# Patient Record
Sex: FEMALE | Race: WHITE | NOT HISPANIC OR LATINO | Employment: UNEMPLOYED | URBAN - METROPOLITAN AREA
[De-identification: names, ages, dates, MRNs, and addresses within clinical notes are randomized per-mention and may not be internally consistent; named-entity substitution may affect disease eponyms.]

---

## 2021-03-15 ENCOUNTER — OFFICE VISIT (OUTPATIENT)
Dept: CARDIOLOGY | Facility: CLINIC | Age: 31
End: 2021-03-15

## 2021-03-15 VITALS
HEART RATE: 62 BPM | SYSTOLIC BLOOD PRESSURE: 114 MMHG | HEIGHT: 71 IN | DIASTOLIC BLOOD PRESSURE: 74 MMHG | WEIGHT: 180 LBS | BODY MASS INDEX: 25.2 KG/M2

## 2021-03-15 DIAGNOSIS — G90.9 AUTONOMIC DYSFUNCTION: Primary | ICD-10-CM

## 2021-03-15 PROCEDURE — 99204 OFFICE O/P NEW MOD 45 MIN: CPT | Performed by: INTERNAL MEDICINE

## 2021-03-15 RX ORDER — DROSPIRENONE AND ETHINYL ESTRADIOL 0.03MG-3MG
1 KIT ORAL DAILY
COMMUNITY
Start: 2021-03-06 | End: 2022-04-14 | Stop reason: SDDI

## 2021-06-02 ENCOUNTER — OFFICE VISIT (OUTPATIENT)
Dept: PODIATRY | Facility: CLINIC | Age: 31
End: 2021-06-02

## 2021-06-02 VITALS
SYSTOLIC BLOOD PRESSURE: 111 MMHG | WEIGHT: 180 LBS | HEIGHT: 71 IN | HEART RATE: 62 BPM | DIASTOLIC BLOOD PRESSURE: 71 MMHG | BODY MASS INDEX: 25.2 KG/M2

## 2021-06-02 DIAGNOSIS — R23.4 FISSURE IN SKIN OF FOOT: Primary | ICD-10-CM

## 2021-06-02 DIAGNOSIS — L85.3 XEROSIS OF SKIN: ICD-10-CM

## 2021-06-02 PROCEDURE — 99203 OFFICE O/P NEW LOW 30 MIN: CPT | Performed by: PODIATRIST

## 2021-06-02 RX ORDER — ESCITALOPRAM OXALATE 20 MG/1
20 TABLET ORAL DAILY
COMMUNITY
Start: 2021-04-02 | End: 2022-04-14 | Stop reason: SDDI

## 2021-06-02 NOTE — PROGRESS NOTES
2021  Foot and Ankle Surgery - New Patient   Provider: Dr. Lul Dunham DPM  Location: [facility]    Subjective:  Deepa Garcia is a 31 y.o. female.     Chief Complaint   Patient presents with   • Left Foot - Pain   • Right Foot - Pain       HPI: Patient is a 31-year-old female that presents with dry cracked skin intermittently to both feet.  Recently she has been having issues involving her left foot with intermittent fissuring.  She has not had any nnamdi open wounds or signs of infection.  She does notice discomfort intermittently with increased activity.  She has not seen a podiatrist in the past.  She has tried over-the-counter medications without any long-term success.  She denies any other issues with her feet.    Allergies   Allergen Reactions   • Metoprolol Rash       Past Medical History:   Diagnosis Date   • Arrhythmia     SVT   • Hypothyroidism        Past Surgical History:   Procedure Laterality Date   • ANKLE SURGERY     • CARDIAC ASSIST DEVICE INSERTION      Loop recorder   •  SECTION     • CHOLECYSTECTOMY     • FERTILITY SURGERY      IVF   • TONSILLECTOMY     • TUBAL ABDOMINAL LIGATION         Family History   Problem Relation Age of Onset   • Hypertension Mother    • Pulmonary embolism Father        Social History     Socioeconomic History   • Marital status:      Spouse name: Not on file   • Number of children: Not on file   • Years of education: Not on file   • Highest education level: Not on file   Tobacco Use   • Smoking status: Never Smoker   • Smokeless tobacco: Never Used   Vaping Use   • Vaping Use: Never used   Substance and Sexual Activity   • Alcohol use: Not Currently   • Drug use: Defer   • Sexual activity: Defer        Current Outpatient Medications on File Prior to Visit   Medication Sig Dispense Refill   • drospirenone-ethinyl estradiol (DYLON,OCELLA) 3-0.03 MG per tablet Take 1 tablet by mouth Daily.     • escitalopram (LEXAPRO) 20 MG tablet Take  "20 mg by mouth Daily.     • levothyroxine (SYNTHROID, LEVOTHROID) 75 MCG tablet Take 75 mcg by mouth Daily.       No current facility-administered medications on file prior to visit.       Review of Systems:  General: Denies fever, chills, fatigue, and weakness.  Eyes: Denies vision loss, blurry vision, and excessive redness.  ENT: Denies hearing issues and difficulty swallowing.  Cardiovascular: Denies palpitations, chest pain, or syncopal episodes.  Respiratory: Denies shortness of breath, wheezing, and coughing.  GI: Denies abdominal pain, nausea, and vomiting.   : Denies frequency, hematuria, and urgency.  Musculoskeletal: Denies muscle cramps, joint pains, and stiffness.  Derm: + Dry cracked skin to her feet  Neuro: Denies headaches, numbness, loss of coordination, and tremors.  Psych: Denies anxiety and depression.  Endocrine: Denies temperature intolerance and changes in appetite.  Heme: Denies bleeding disorders or abnormal bruising.     Objective   /71   Pulse 62   Ht 180.3 cm (71\")   Wt 81.6 kg (180 lb)   BMI 25.10 kg/m²     Foot/Ankle Exam:       General:   Appearance: appears stated age and healthy    Orientation: AAOx3    Affect: appropriate    Gait: unimpaired      VASCULAR      Right Foot Vascularity   Normal vascular exam    Dorsalis pedis:  2+  Posterior tibial:  2+  Skin Temperature: warm    Edema Grading:  None  CFT:  < 3 seconds  Pedal Hair Growth:  Present  Varicosities: none       Left Foot Vascularity   Normal vascular exam    Dorsalis pedis:  2+  Posterior tibial:  2+  Skin Temperature: warm    Edema Grading:  None  CFT:  < 3 seconds  Pedal Hair Growth:  Present  Varicosities: none        NEUROLOGIC     Right Foot Neurologic   Light touch sensation:  Normal  Hot/Cold sensation: normal    Achilles reflex:  2+     Left Foot Neurologic   Light touch sensation:  Normal  Hot/cold sensation: normal    Achilles reflex:  2+     MUSCULOSKELETAL      Right Foot Musculoskeletal   Ecchymosis: "  None  Tenderness: none       Left Foot Musculoskeletal   Ecchymosis:  None  Tenderness: none       MUSCLE STRENGTH     Right Foot Muscle Strength   Normal strength    Foot dorsiflexion:  5  Foot plantar flexion:  5  Foot inversion:  5  Foot eversion:  5     Left Foot Muscle Strength   Normal strength    Foot dorsiflexion:  5  Foot plantar flexion:  5  Foot inversion:  5  Foot eversion:  5     DERMATOLOGIC     Right Foot Dermatologic   Skin: dry skin       Left Foot Dermatologic   Skin: dry skin and fissure    Skin: no left foot cellulitis and no left foot ulcer       TESTS     Right Foot Tests   Anterior drawer: negative    Varus tilt: negative       Left Foot Tests   Anterior drawer: negative    Varus tilt: negative        Right Foot Additional Comments No gross deformity or instability to the feet.  Comfort with palpation.      Left Foot Additional Comments: Minimal fissuring noted to various areas of the left great toe.  Heels appear appropriate.      Assessment/Plan   Diagnoses and all orders for this visit:    1. Fissure in skin of foot (Primary)    2. Xerosis of skin      Patient presents with long duration of intermittent dry cracked skin involving the plantar aspect of both feet.  Recently she has been having issues with her left great toe.  She has no open wounds or pain today.  She has tried over-the-counter remedies without any significant improvement.  I did discuss the diagnosis and treatment options.  I have recommended that we proceed with urea 40% topical.  I have prescribed a medication and discussed proper use.  We also reviewed the importance of routine pedicures which may help these issues.  I have asked that she call with any additional issues or concerns.  She has opted to see me on an as-needed basis at this time.    No orders of the defined types were placed in this encounter.       Note is dictated utilizing voice recognition software. Unfortunately this leads to occasional typographical  errors. I apologize in advance if the situation occurs. If questions occur please do not hesitate to call our office.

## 2022-03-02 ENCOUNTER — TELEPHONE (OUTPATIENT)
Dept: CARDIOLOGY | Facility: CLINIC | Age: 32
End: 2022-03-02

## 2022-03-02 NOTE — TELEPHONE ENCOUNTER
3/2/22-Dr Garcia  Pt: Deepa Garcia    Pt. Would like for you to tell Dr Garcia thank you for helping her. He referred her to [facility] and they are helping her. She just wanted to let him know how much she appreciated him.

## 2022-04-14 ENCOUNTER — HOSPITAL ENCOUNTER (OUTPATIENT)
Dept: INFUSION THERAPY | Facility: HOSPITAL | Age: 32
Setting detail: INFUSION SERIES
Discharge: HOME | End: 2022-04-14

## 2022-04-14 VITALS
SYSTOLIC BLOOD PRESSURE: 108 MMHG | HEART RATE: 61 BPM | RESPIRATION RATE: 17 BRPM | OXYGEN SATURATION: 99 % | DIASTOLIC BLOOD PRESSURE: 72 MMHG

## 2022-04-14 DIAGNOSIS — E86.0 DEHYDRATION: Primary | ICD-10-CM

## 2022-04-14 DIAGNOSIS — A18.01 POTT'S DISEASE: ICD-10-CM

## 2022-04-14 PROCEDURE — 96365 THER/PROPH/DIAG IV INF INIT: CPT

## 2022-04-14 PROCEDURE — 96366 THER/PROPH/DIAG IV INF ADDON: CPT

## 2022-04-14 PROCEDURE — 36415 COLL VENOUS BLD VENIPUNCTURE: CPT

## 2022-04-14 PROCEDURE — 96360 HYDRATION IV INFUSION INIT: CPT

## 2022-04-14 RX ORDER — MIDODRINE HYDROCHLORIDE 2.5 MG/1
5 TABLET ORAL 3 TIMES DAILY
COMMUNITY
Start: 2022-03-23 | End: 2023-04-01

## 2022-04-14 RX ORDER — PROPRANOLOL HYDROCHLORIDE 10 MG/1
20 TABLET ORAL 3 TIMES DAILY
COMMUNITY
Start: 2022-03-01

## 2022-04-14 RX ORDER — FLUDROCORTISONE ACETATE 0.1 MG/1
0.05 TABLET ORAL DAILY
COMMUNITY
Start: 2022-04-07 | End: 2023-01-21

## 2022-04-14 RX ORDER — SODIUM CHLORIDE 1000 MG
TABLET, SOLUBLE MISCELLANEOUS
COMMUNITY
Start: 2022-03-10 | End: 2023-01-21

## 2022-04-14 RX ORDER — GUANFACINE 1 MG/1
2.5 TABLET, EXTENDED RELEASE ORAL DAILY
COMMUNITY
Start: 2022-03-01

## 2022-04-14 RX ORDER — LEVOTHYROXINE SODIUM 0.07 MG/1
1 TABLET ORAL DAILY
COMMUNITY

## 2022-04-14 RX ADMIN — SODIUM CHLORIDE, POTASSIUM CHLORIDE, SODIUM LACTATE AND CALCIUM CHLORIDE 1000 ML: 600; 310; 30; 20 INJECTION, SOLUTION INTRAVENOUS at 11:32

## 2022-04-18 ENCOUNTER — HOSPITAL ENCOUNTER (OUTPATIENT)
Dept: INFUSION THERAPY | Facility: HOSPITAL | Age: 32
Setting detail: INFUSION SERIES
Discharge: HOME | End: 2022-04-18

## 2022-04-18 VITALS
DIASTOLIC BLOOD PRESSURE: 72 MMHG | SYSTOLIC BLOOD PRESSURE: 106 MMHG | HEART RATE: 64 BPM | RESPIRATION RATE: 16 BRPM | OXYGEN SATURATION: 100 %

## 2022-04-18 DIAGNOSIS — A18.01 POTT'S DISEASE: ICD-10-CM

## 2022-04-18 DIAGNOSIS — E86.0 DEHYDRATION: Primary | ICD-10-CM

## 2022-04-18 PROCEDURE — 96360 HYDRATION IV INFUSION INIT: CPT

## 2022-04-18 RX ADMIN — SODIUM CHLORIDE, POTASSIUM CHLORIDE, SODIUM LACTATE AND CALCIUM CHLORIDE 1000 ML: 600; 310; 30; 20 INJECTION, SOLUTION INTRAVENOUS at 11:45

## 2022-04-20 ENCOUNTER — HOSPITAL ENCOUNTER (OUTPATIENT)
Dept: INFUSION THERAPY | Facility: HOSPITAL | Age: 32
Setting detail: INFUSION SERIES
Discharge: HOME | End: 2022-04-20

## 2022-04-20 VITALS
HEART RATE: 75 BPM | OXYGEN SATURATION: 100 % | DIASTOLIC BLOOD PRESSURE: 77 MMHG | SYSTOLIC BLOOD PRESSURE: 118 MMHG | RESPIRATION RATE: 16 BRPM

## 2022-04-20 DIAGNOSIS — A18.01 POTT'S DISEASE: ICD-10-CM

## 2022-04-20 DIAGNOSIS — A18.01 POTT'S DISEASE: Primary | ICD-10-CM

## 2022-04-20 DIAGNOSIS — E86.0 DEHYDRATION: Primary | ICD-10-CM

## 2022-04-20 PROCEDURE — 96365 THER/PROPH/DIAG IV INF INIT: CPT

## 2022-04-20 PROCEDURE — 96360 HYDRATION IV INFUSION INIT: CPT

## 2022-04-20 RX ADMIN — SODIUM CHLORIDE, POTASSIUM CHLORIDE, SODIUM LACTATE AND CALCIUM CHLORIDE 1000 ML: 600; 310; 30; 20 INJECTION, SOLUTION INTRAVENOUS at 11:33

## 2022-04-22 ENCOUNTER — HOSPITAL ENCOUNTER (OUTPATIENT)
Dept: INFUSION THERAPY | Facility: HOSPITAL | Age: 32
Setting detail: INFUSION SERIES
Discharge: HOME | End: 2022-04-22

## 2022-04-22 VITALS
SYSTOLIC BLOOD PRESSURE: 116 MMHG | DIASTOLIC BLOOD PRESSURE: 75 MMHG | RESPIRATION RATE: 17 BRPM | HEART RATE: 72 BPM | OXYGEN SATURATION: 100 %

## 2022-04-22 DIAGNOSIS — A18.01 POTT'S DISEASE: ICD-10-CM

## 2022-04-22 DIAGNOSIS — E86.0 DEHYDRATION: Primary | ICD-10-CM

## 2022-04-22 LAB
ALBUMIN SERPL-MCNC: 4.2 G/DL (ref 3.5–5.2)
ALBUMIN/GLOB SERPL: 1.7 G/DL
ALP SERPL-CCNC: 49 U/L (ref 39–117)
ALT SERPL W P-5'-P-CCNC: 12 U/L (ref 1–33)
ANION GAP SERPL CALCULATED.3IONS-SCNC: 10 MMOL/L (ref 5–15)
AST SERPL-CCNC: 17 U/L (ref 1–32)
BILIRUB SERPL-MCNC: 0.7 MG/DL (ref 0–1.2)
BUN SERPL-MCNC: 10 MG/DL (ref 6–20)
BUN/CREAT SERPL: 11.6 (ref 7–25)
CALCIUM SPEC-SCNC: 8.9 MG/DL (ref 8.6–10.5)
CHLORIDE SERPL-SCNC: 103 MMOL/L (ref 98–107)
CO2 SERPL-SCNC: 26 MMOL/L (ref 22–29)
CREAT SERPL-MCNC: 0.86 MG/DL (ref 0.57–1)
EGFRCR SERPLBLD CKD-EPI 2021: 91.6 ML/MIN/1.73
GLOBULIN UR ELPH-MCNC: 2.5 GM/DL
GLUCOSE SERPL-MCNC: 93 MG/DL (ref 65–99)
POTASSIUM SERPL-SCNC: 4.6 MMOL/L (ref 3.5–5.2)
PROT SERPL-MCNC: 6.7 G/DL (ref 6–8.5)
SODIUM SERPL-SCNC: 139 MMOL/L (ref 136–145)
T4 FREE SERPL-MCNC: 1.63 NG/DL (ref 0.93–1.7)
TSH SERPL DL<=0.05 MIU/L-ACNC: 2.49 UIU/ML (ref 0.27–4.2)

## 2022-04-22 PROCEDURE — 96365 THER/PROPH/DIAG IV INF INIT: CPT

## 2022-04-22 PROCEDURE — 36415 COLL VENOUS BLD VENIPUNCTURE: CPT

## 2022-04-22 PROCEDURE — 84443 ASSAY THYROID STIM HORMONE: CPT | Performed by: FAMILY MEDICINE

## 2022-04-22 PROCEDURE — 96360 HYDRATION IV INFUSION INIT: CPT

## 2022-04-22 PROCEDURE — 84439 ASSAY OF FREE THYROXINE: CPT | Performed by: FAMILY MEDICINE

## 2022-04-22 PROCEDURE — 80053 COMPREHEN METABOLIC PANEL: CPT | Performed by: FAMILY MEDICINE

## 2022-04-22 RX ADMIN — SODIUM CHLORIDE, POTASSIUM CHLORIDE, SODIUM LACTATE AND CALCIUM CHLORIDE 1000 ML: 600; 310; 30; 20 INJECTION, SOLUTION INTRAVENOUS at 11:17

## 2022-04-25 ENCOUNTER — HOSPITAL ENCOUNTER (OUTPATIENT)
Dept: INFUSION THERAPY | Facility: HOSPITAL | Age: 32
Setting detail: INFUSION SERIES
Discharge: HOME | End: 2022-04-25

## 2022-04-25 VITALS
SYSTOLIC BLOOD PRESSURE: 110 MMHG | DIASTOLIC BLOOD PRESSURE: 70 MMHG | TEMPERATURE: 98.3 F | RESPIRATION RATE: 16 BRPM | HEART RATE: 64 BPM

## 2022-04-25 DIAGNOSIS — E86.0 DEHYDRATION: Primary | ICD-10-CM

## 2022-04-25 DIAGNOSIS — A18.01 POTT'S DISEASE: ICD-10-CM

## 2022-04-25 PROCEDURE — 96360 HYDRATION IV INFUSION INIT: CPT

## 2022-04-25 PROCEDURE — 36415 COLL VENOUS BLD VENIPUNCTURE: CPT

## 2022-04-25 PROCEDURE — 96365 THER/PROPH/DIAG IV INF INIT: CPT

## 2022-04-25 RX ADMIN — SODIUM CHLORIDE, POTASSIUM CHLORIDE, SODIUM LACTATE AND CALCIUM CHLORIDE 1000 ML: 600; 310; 30; 20 INJECTION, SOLUTION INTRAVENOUS at 09:16

## 2022-04-27 ENCOUNTER — HOSPITAL ENCOUNTER (OUTPATIENT)
Dept: INFUSION THERAPY | Facility: HOSPITAL | Age: 32
Setting detail: INFUSION SERIES
Discharge: HOME | End: 2022-04-27

## 2022-04-27 VITALS
DIASTOLIC BLOOD PRESSURE: 81 MMHG | HEART RATE: 75 BPM | RESPIRATION RATE: 16 BRPM | OXYGEN SATURATION: 100 % | SYSTOLIC BLOOD PRESSURE: 117 MMHG

## 2022-04-27 DIAGNOSIS — E86.0 DEHYDRATION: Primary | ICD-10-CM

## 2022-04-27 DIAGNOSIS — A18.01 POTT'S DISEASE: ICD-10-CM

## 2022-04-27 PROCEDURE — 36415 COLL VENOUS BLD VENIPUNCTURE: CPT

## 2022-04-27 PROCEDURE — 96361 HYDRATE IV INFUSION ADD-ON: CPT

## 2022-04-27 PROCEDURE — 96360 HYDRATION IV INFUSION INIT: CPT

## 2022-04-27 RX ADMIN — SODIUM CHLORIDE, POTASSIUM CHLORIDE, SODIUM LACTATE AND CALCIUM CHLORIDE 1000 ML: 600; 310; 30; 20 INJECTION, SOLUTION INTRAVENOUS at 09:15

## 2022-04-29 ENCOUNTER — HOSPITAL ENCOUNTER (OUTPATIENT)
Dept: INFUSION THERAPY | Facility: HOSPITAL | Age: 32
Setting detail: INFUSION SERIES
Discharge: HOME | End: 2022-04-29

## 2022-04-29 VITALS
TEMPERATURE: 96.6 F | DIASTOLIC BLOOD PRESSURE: 80 MMHG | SYSTOLIC BLOOD PRESSURE: 126 MMHG | HEART RATE: 70 BPM | RESPIRATION RATE: 16 BRPM

## 2022-04-29 DIAGNOSIS — A18.01 POTT'S DISEASE: ICD-10-CM

## 2022-04-29 DIAGNOSIS — E86.0 DEHYDRATION: Primary | ICD-10-CM

## 2022-04-29 PROCEDURE — 96360 HYDRATION IV INFUSION INIT: CPT

## 2022-04-29 PROCEDURE — 36415 COLL VENOUS BLD VENIPUNCTURE: CPT

## 2022-04-29 RX ADMIN — SODIUM CHLORIDE, POTASSIUM CHLORIDE, SODIUM LACTATE AND CALCIUM CHLORIDE 1000 ML: 600; 310; 30; 20 INJECTION, SOLUTION INTRAVENOUS at 09:22

## 2022-05-02 ENCOUNTER — HOSPITAL ENCOUNTER (OUTPATIENT)
Dept: INFUSION THERAPY | Facility: HOSPITAL | Age: 32
Setting detail: INFUSION SERIES
Discharge: HOME | End: 2022-05-02

## 2022-05-02 VITALS
DIASTOLIC BLOOD PRESSURE: 82 MMHG | HEART RATE: 76 BPM | RESPIRATION RATE: 16 BRPM | OXYGEN SATURATION: 100 % | SYSTOLIC BLOOD PRESSURE: 121 MMHG

## 2022-05-02 DIAGNOSIS — A18.01 POTT'S DISEASE: ICD-10-CM

## 2022-05-02 DIAGNOSIS — E86.0 DEHYDRATION: Primary | ICD-10-CM

## 2022-05-02 PROCEDURE — 96365 THER/PROPH/DIAG IV INF INIT: CPT

## 2022-05-02 PROCEDURE — 96360 HYDRATION IV INFUSION INIT: CPT

## 2022-05-02 RX ADMIN — SODIUM CHLORIDE, POTASSIUM CHLORIDE, SODIUM LACTATE AND CALCIUM CHLORIDE 1000 ML: 600; 310; 30; 20 INJECTION, SOLUTION INTRAVENOUS at 09:27

## 2022-05-04 ENCOUNTER — HOSPITAL ENCOUNTER (OUTPATIENT)
Dept: INFUSION THERAPY | Facility: HOSPITAL | Age: 32
Setting detail: INFUSION SERIES
Discharge: HOME | End: 2022-05-04

## 2022-05-04 VITALS
RESPIRATION RATE: 16 BRPM | DIASTOLIC BLOOD PRESSURE: 75 MMHG | TEMPERATURE: 97.6 F | HEART RATE: 72 BPM | SYSTOLIC BLOOD PRESSURE: 126 MMHG

## 2022-05-04 DIAGNOSIS — A18.01 POTT'S DISEASE: ICD-10-CM

## 2022-05-04 DIAGNOSIS — E86.0 DEHYDRATION: Primary | ICD-10-CM

## 2022-05-04 PROCEDURE — 96360 HYDRATION IV INFUSION INIT: CPT

## 2022-05-04 PROCEDURE — 36415 COLL VENOUS BLD VENIPUNCTURE: CPT

## 2022-05-04 RX ADMIN — SODIUM CHLORIDE, POTASSIUM CHLORIDE, SODIUM LACTATE AND CALCIUM CHLORIDE 1000 ML: 600; 310; 30; 20 INJECTION, SOLUTION INTRAVENOUS at 09:20

## 2022-05-06 ENCOUNTER — HOSPITAL ENCOUNTER (OUTPATIENT)
Dept: INFUSION THERAPY | Facility: HOSPITAL | Age: 32
Setting detail: INFUSION SERIES
Discharge: HOME | End: 2022-05-06

## 2022-05-06 ENCOUNTER — HOSPITAL ENCOUNTER (OUTPATIENT)
Facility: HOSPITAL | Age: 32
Setting detail: OBSERVATION
Discharge: HOME | End: 2022-05-07
Attending: FAMILY MEDICINE | Admitting: INTERNAL MEDICINE

## 2022-05-06 VITALS — DIASTOLIC BLOOD PRESSURE: 76 MMHG | HEART RATE: 91 BPM | SYSTOLIC BLOOD PRESSURE: 108 MMHG | RESPIRATION RATE: 12 BRPM

## 2022-05-06 DIAGNOSIS — E86.0 DEHYDRATION: Primary | ICD-10-CM

## 2022-05-06 DIAGNOSIS — A18.01 POTT'S DISEASE: ICD-10-CM

## 2022-05-06 DIAGNOSIS — A18.01 POTT'S DISEASE: Primary | ICD-10-CM

## 2022-05-06 LAB
ALBUMIN SERPL-MCNC: 3.6 G/DL (ref 3.5–5.2)
ALBUMIN/GLOB SERPL: 1.3 G/DL
ALP SERPL-CCNC: 57 U/L (ref 39–117)
ALT SERPL W P-5'-P-CCNC: 11 U/L (ref 1–33)
ANION GAP SERPL CALCULATED.3IONS-SCNC: 9 MMOL/L (ref 5–15)
AST SERPL-CCNC: 21 U/L (ref 1–32)
BASOPHILS # BLD AUTO: 0 10*3/MM3 (ref 0–0.2)
BASOPHILS NFR BLD AUTO: 0.3 % (ref 0–1.5)
BILIRUB SERPL-MCNC: 0.6 MG/DL (ref 0–1.2)
BUN SERPL-MCNC: 8 MG/DL (ref 6–20)
BUN/CREAT SERPL: 13.1 (ref 7–25)
CALCIUM SPEC-SCNC: 8.1 MG/DL (ref 8.6–10.5)
CHLORIDE SERPL-SCNC: 104 MMOL/L (ref 98–107)
CO2 SERPL-SCNC: 25 MMOL/L (ref 22–29)
CREAT SERPL-MCNC: 0.61 MG/DL (ref 0.57–1)
DEPRECATED RDW RBC AUTO: 40.7 FL (ref 37–54)
EGFRCR SERPLBLD CKD-EPI 2021: 121.2 ML/MIN/1.73
EOSINOPHIL # BLD AUTO: 0.1 10*3/MM3 (ref 0–0.4)
EOSINOPHIL NFR BLD AUTO: 3.1 % (ref 0.3–6.2)
ERYTHROCYTE [DISTWIDTH] IN BLOOD BY AUTOMATED COUNT: 13.4 % (ref 12.3–15.4)
GLOBULIN UR ELPH-MCNC: 2.8 GM/DL
GLUCOSE SERPL-MCNC: 96 MG/DL (ref 65–99)
HCT VFR BLD AUTO: 41.1 % (ref 34–46.6)
HGB BLD-MCNC: 13.6 G/DL (ref 12–15.9)
LYMPHOCYTES # BLD AUTO: 0.8 10*3/MM3 (ref 0.7–3.1)
LYMPHOCYTES NFR BLD AUTO: 19.7 % (ref 19.6–45.3)
MAGNESIUM SERPL-MCNC: 1.9 MG/DL (ref 1.6–2.6)
MCH RBC QN AUTO: 28.7 PG (ref 26.6–33)
MCHC RBC AUTO-ENTMCNC: 33.1 G/DL (ref 31.5–35.7)
MCV RBC AUTO: 86.7 FL (ref 79–97)
MONOCYTES # BLD AUTO: 0.6 10*3/MM3 (ref 0.1–0.9)
MONOCYTES NFR BLD AUTO: 15.1 % (ref 5–12)
NEUTROPHILS NFR BLD AUTO: 2.4 10*3/MM3 (ref 1.7–7)
NEUTROPHILS NFR BLD AUTO: 61.8 % (ref 42.7–76)
NRBC BLD AUTO-RTO: 0.4 /100 WBC (ref 0–0.2)
PHOSPHATE SERPL-MCNC: 2.3 MG/DL (ref 2.5–4.5)
PLATELET # BLD AUTO: 220 10*3/MM3 (ref 140–450)
PMV BLD AUTO: 6.9 FL (ref 6–12)
POTASSIUM SERPL-SCNC: 4.5 MMOL/L (ref 3.5–5.2)
PROT SERPL-MCNC: 6.4 G/DL (ref 6–8.5)
RBC # BLD AUTO: 4.74 10*6/MM3 (ref 3.77–5.28)
SARS-COV-2 RNA PNL SPEC NAA+PROBE: NOT DETECTED
SODIUM SERPL-SCNC: 138 MMOL/L (ref 136–145)
TSH SERPL DL<=0.05 MIU/L-ACNC: 1.35 UIU/ML (ref 0.27–4.2)
WBC NRBC COR # BLD: 3.9 10*3/MM3 (ref 3.4–10.8)

## 2022-05-06 PROCEDURE — 96367 TX/PROPH/DG ADDL SEQ IV INF: CPT

## 2022-05-06 PROCEDURE — 84443 ASSAY THYROID STIM HORMONE: CPT | Performed by: FAMILY MEDICINE

## 2022-05-06 PROCEDURE — 96360 HYDRATION IV INFUSION INIT: CPT

## 2022-05-06 PROCEDURE — 96365 THER/PROPH/DIAG IV INF INIT: CPT

## 2022-05-06 PROCEDURE — 96375 TX/PRO/DX INJ NEW DRUG ADDON: CPT

## 2022-05-06 PROCEDURE — 96372 THER/PROPH/DIAG INJ SC/IM: CPT

## 2022-05-06 PROCEDURE — G0378 HOSPITAL OBSERVATION PER HR: HCPCS

## 2022-05-06 PROCEDURE — 25010000002 ENOXAPARIN PER 10 MG: Performed by: FAMILY MEDICINE

## 2022-05-06 PROCEDURE — 25010000002 ONDANSETRON PER 1 MG: Performed by: FAMILY MEDICINE

## 2022-05-06 PROCEDURE — 25010000002 CALCIUM GLUCONATE-NACL 1-0.675 GM/50ML-% SOLUTION: Performed by: INTERNAL MEDICINE

## 2022-05-06 PROCEDURE — C9803 HOPD COVID-19 SPEC COLLECT: HCPCS

## 2022-05-06 PROCEDURE — 85025 COMPLETE CBC W/AUTO DIFF WBC: CPT | Performed by: FAMILY MEDICINE

## 2022-05-06 PROCEDURE — 80053 COMPREHEN METABOLIC PANEL: CPT | Performed by: FAMILY MEDICINE

## 2022-05-06 PROCEDURE — 83735 ASSAY OF MAGNESIUM: CPT | Performed by: FAMILY MEDICINE

## 2022-05-06 PROCEDURE — 25010000002 MAGNESIUM SULFATE IN D5W 1G/100ML (PREMIX) 1-5 GM/100ML-% SOLUTION: Performed by: INTERNAL MEDICINE

## 2022-05-06 PROCEDURE — G0379 DIRECT REFER HOSPITAL OBSERV: HCPCS

## 2022-05-06 PROCEDURE — 87635 SARS-COV-2 COVID-19 AMP PRB: CPT | Performed by: FAMILY MEDICINE

## 2022-05-06 PROCEDURE — 84100 ASSAY OF PHOSPHORUS: CPT | Performed by: FAMILY MEDICINE

## 2022-05-06 PROCEDURE — 96366 THER/PROPH/DIAG IV INF ADDON: CPT

## 2022-05-06 RX ORDER — ACETAMINOPHEN 325 MG/1
650 TABLET ORAL EVERY 6 HOURS PRN
Status: DISCONTINUED | OUTPATIENT
Start: 2022-05-06 | End: 2022-05-06 | Stop reason: SDUPTHER

## 2022-05-06 RX ORDER — LEVOTHYROXINE SODIUM 0.07 MG/1
75 TABLET ORAL
Status: DISCONTINUED | OUTPATIENT
Start: 2022-05-06 | End: 2022-05-07 | Stop reason: HOSPADM

## 2022-05-06 RX ORDER — POTASSIUM CHLORIDE 7.45 MG/ML
10 INJECTION INTRAVENOUS
Status: DISCONTINUED | OUTPATIENT
Start: 2022-05-06 | End: 2022-05-07 | Stop reason: HOSPADM

## 2022-05-06 RX ORDER — SODIUM CHLORIDE 1000 MG
1 TABLET, SOLUBLE MISCELLANEOUS DAILY
Status: DISCONTINUED | OUTPATIENT
Start: 2022-05-06 | End: 2022-05-06

## 2022-05-06 RX ORDER — MAGNESIUM SULFATE HEPTAHYDRATE 40 MG/ML
2 INJECTION, SOLUTION INTRAVENOUS AS NEEDED
Status: DISCONTINUED | OUTPATIENT
Start: 2022-05-06 | End: 2022-05-07 | Stop reason: HOSPADM

## 2022-05-06 RX ORDER — MIDODRINE HYDROCHLORIDE 5 MG/1
2.5 TABLET ORAL
Status: DISCONTINUED | OUTPATIENT
Start: 2022-05-06 | End: 2022-05-07 | Stop reason: HOSPADM

## 2022-05-06 RX ORDER — ONDANSETRON 2 MG/ML
4 INJECTION INTRAMUSCULAR; INTRAVENOUS EVERY 6 HOURS PRN
Status: DISCONTINUED | OUTPATIENT
Start: 2022-05-06 | End: 2022-05-06 | Stop reason: SDUPTHER

## 2022-05-06 RX ORDER — SODIUM CHLORIDE AND POTASSIUM CHLORIDE 150; 900 MG/100ML; MG/100ML
100 INJECTION, SOLUTION INTRAVENOUS CONTINUOUS
Status: DISCONTINUED | OUTPATIENT
Start: 2022-05-06 | End: 2022-05-06

## 2022-05-06 RX ORDER — PROPRANOLOL HYDROCHLORIDE 20 MG/1
10 TABLET ORAL 3 TIMES DAILY
Status: DISCONTINUED | OUTPATIENT
Start: 2022-05-06 | End: 2022-05-07 | Stop reason: HOSPADM

## 2022-05-06 RX ORDER — ONDANSETRON 4 MG/1
4 TABLET, FILM COATED ORAL EVERY 6 HOURS PRN
Status: DISCONTINUED | OUTPATIENT
Start: 2022-05-06 | End: 2022-05-07 | Stop reason: HOSPADM

## 2022-05-06 RX ORDER — ACETAMINOPHEN 325 MG/1
650 TABLET ORAL EVERY 4 HOURS PRN
Status: DISCONTINUED | OUTPATIENT
Start: 2022-05-06 | End: 2022-05-07 | Stop reason: HOSPADM

## 2022-05-06 RX ORDER — SODIUM CHLORIDE 1000 MG
1 TABLET, SOLUBLE MISCELLANEOUS 2 TIMES DAILY WITH MEALS
Status: DISCONTINUED | OUTPATIENT
Start: 2022-05-07 | End: 2022-05-07 | Stop reason: HOSPADM

## 2022-05-06 RX ORDER — SODIUM CHLORIDE 0.9 % (FLUSH) 0.9 %
3 SYRINGE (ML) INJECTION EVERY 12 HOURS SCHEDULED
Status: DISCONTINUED | OUTPATIENT
Start: 2022-05-06 | End: 2022-05-07 | Stop reason: HOSPADM

## 2022-05-06 RX ORDER — ACETAMINOPHEN 160 MG/5ML
650 SOLUTION ORAL EVERY 4 HOURS PRN
Status: DISCONTINUED | OUTPATIENT
Start: 2022-05-06 | End: 2022-05-07 | Stop reason: HOSPADM

## 2022-05-06 RX ORDER — FLUDROCORTISONE ACETATE 0.1 MG/1
0.05 TABLET ORAL EVERY 12 HOURS SCHEDULED
Status: DISCONTINUED | OUTPATIENT
Start: 2022-05-06 | End: 2022-05-07 | Stop reason: HOSPADM

## 2022-05-06 RX ORDER — SODIUM CHLORIDE 9 MG/ML
100 INJECTION, SOLUTION INTRAVENOUS CONTINUOUS
Status: DISCONTINUED | OUTPATIENT
Start: 2022-05-06 | End: 2022-05-07

## 2022-05-06 RX ORDER — ONDANSETRON 2 MG/ML
4 INJECTION INTRAMUSCULAR; INTRAVENOUS EVERY 6 HOURS PRN
Status: DISCONTINUED | OUTPATIENT
Start: 2022-05-06 | End: 2022-05-07 | Stop reason: HOSPADM

## 2022-05-06 RX ORDER — ACETAMINOPHEN 650 MG/1
650 SUPPOSITORY RECTAL EVERY 4 HOURS PRN
Status: DISCONTINUED | OUTPATIENT
Start: 2022-05-06 | End: 2022-05-07 | Stop reason: HOSPADM

## 2022-05-06 RX ORDER — FLUDROCORTISONE ACETATE 0.1 MG/1
0.05 TABLET ORAL DAILY
Status: DISCONTINUED | OUTPATIENT
Start: 2022-05-06 | End: 2022-05-06

## 2022-05-06 RX ORDER — CALCIUM GLUCONATE 20 MG/ML
1 INJECTION, SOLUTION INTRAVENOUS ONCE
Status: COMPLETED | OUTPATIENT
Start: 2022-05-06 | End: 2022-05-06

## 2022-05-06 RX ORDER — SODIUM CHLORIDE 0.9 % (FLUSH) 0.9 %
3-10 SYRINGE (ML) INJECTION AS NEEDED
Status: DISCONTINUED | OUTPATIENT
Start: 2022-05-06 | End: 2022-05-07 | Stop reason: HOSPADM

## 2022-05-06 RX ORDER — POTASSIUM CHLORIDE 20 MEQ/1
40 TABLET, EXTENDED RELEASE ORAL AS NEEDED
Status: DISCONTINUED | OUTPATIENT
Start: 2022-05-06 | End: 2022-05-07 | Stop reason: HOSPADM

## 2022-05-06 RX ORDER — POTASSIUM CHLORIDE 1.5 G/1.77G
40 POWDER, FOR SOLUTION ORAL AS NEEDED
Status: DISCONTINUED | OUTPATIENT
Start: 2022-05-06 | End: 2022-05-07 | Stop reason: HOSPADM

## 2022-05-06 RX ORDER — MAGNESIUM SULFATE HEPTAHYDRATE 40 MG/ML
4 INJECTION, SOLUTION INTRAVENOUS AS NEEDED
Status: DISCONTINUED | OUTPATIENT
Start: 2022-05-06 | End: 2022-05-07 | Stop reason: HOSPADM

## 2022-05-06 RX ORDER — MAGNESIUM SULFATE 1 G/100ML
1 INJECTION INTRAVENOUS ONCE
Status: COMPLETED | OUTPATIENT
Start: 2022-05-06 | End: 2022-05-06

## 2022-05-06 RX ORDER — ENOXAPARIN SODIUM 100 MG/ML
40 INJECTION SUBCUTANEOUS
Status: DISCONTINUED | OUTPATIENT
Start: 2022-05-06 | End: 2022-05-07 | Stop reason: HOSPADM

## 2022-05-06 RX ADMIN — ONDANSETRON 4 MG: 2 INJECTION INTRAMUSCULAR; INTRAVENOUS at 19:55

## 2022-05-06 RX ADMIN — ENOXAPARIN SODIUM 40 MG: 100 INJECTION SUBCUTANEOUS at 16:33

## 2022-05-06 RX ADMIN — LEVOTHYROXINE SODIUM 75 MCG: 0.07 TABLET ORAL at 14:04

## 2022-05-06 RX ADMIN — SODIUM CHLORIDE, POTASSIUM CHLORIDE, SODIUM LACTATE AND CALCIUM CHLORIDE 1000 ML: 600; 310; 30; 20 INJECTION, SOLUTION INTRAVENOUS at 09:19

## 2022-05-06 RX ADMIN — MIDODRINE HYDROCHLORIDE 2.5 MG: 5 TABLET ORAL at 18:51

## 2022-05-06 RX ADMIN — FLUDROCORTISONE ACETATE 0.05 MG: 0.1 TABLET ORAL at 14:04

## 2022-05-06 RX ADMIN — CALCIUM GLUCONATE 1 G: 20 INJECTION, SOLUTION INTRAVENOUS at 20:21

## 2022-05-06 RX ADMIN — Medication 3 ML: at 20:22

## 2022-05-06 RX ADMIN — Medication 3 ML: at 15:57

## 2022-05-06 RX ADMIN — MIDODRINE HYDROCHLORIDE 2.5 MG: 5 TABLET ORAL at 14:04

## 2022-05-06 RX ADMIN — Medication 2 PACKET: at 18:50

## 2022-05-06 RX ADMIN — Medication 1 G: at 14:04

## 2022-05-06 RX ADMIN — MAGNESIUM SULFATE 1 G: 1 INJECTION INTRAVENOUS at 21:34

## 2022-05-06 RX ADMIN — FLUDROCORTISONE ACETATE 0.05 MG: 0.1 TABLET ORAL at 21:31

## 2022-05-06 RX ADMIN — SODIUM CHLORIDE 100 ML/HR: 9 INJECTION, SOLUTION INTRAVENOUS at 15:58

## 2022-05-06 NOTE — CONSULTS
NEPHROLOGY CONSULTATION-----[facility]    [facility]  [phone]  Mallory Pryor MD    Patient Care Team:  Justine Watts MD as PCP - General (Family Medicine)  John Phan MD as Consulting Physician (Nephrology)    CC/REASON FOR CONSULTATION: DEHYDRATION/ELECTROLYTE AND ACID-BASE ABNORMALITIES    PHYSICIAN REQUESTING CONSULTATION:     History of Present Illness    HPI    Patient is a 32 y.o. WF with POTS syndrome whom I was asked to see in consultation for evaluation and management of dehydration, electrolyte abnormalities, and acid-base disturbance. Patient was scheduled to see my partner,  as an outpatient but ended up being admitted with need for IVFs. Patient has seen another Nephrologist group in the past but was switching to our care. Reports h/o proteinuria, lactic acidosis related to Metformin use with dehydration, lactation acidosis also. No NSAIDs or recent IV dye exposure. No known h/o hepatitis, TB, rheumatic fever, jaundice, SLE, bleeding/bruising disorders.  No real urinary sx outside of decreased urine output. No edema or fluid retention.  +Compliance with home meds. Was not on diuretics or laxatives prior to admission. Was not on ACE-I/ARB prior to admission. No herbal med use. Has a history of SVT and Hypothyroidism. Also reports poor oral intake and likely that she got the viral gastroenteritis that her daughter recently had. Also reports fever of 103 degrees Farenheit yesterday.    Review of Systems   Constitutional: Positive for activity change, appetite change, fatigue and fever. Negative for chills, diaphoresis and unexpected weight change.   HENT: Negative for congestion, dental problem, drooling, ear discharge, ear pain, facial swelling, hearing loss, mouth sores, nosebleeds, postnasal drip, rhinorrhea, sinus pressure, sinus pain, sneezing, sore throat, tinnitus, trouble swallowing and voice change.     Eyes: Negative for photophobia, pain, discharge, redness, itching and visual disturbance.   Respiratory: Negative for apnea, cough, choking, chest tightness, shortness of breath, wheezing and stridor.    Cardiovascular: Positive for palpitations. Negative for chest pain and leg swelling.   Gastrointestinal: Positive for nausea and vomiting. Negative for abdominal distention, abdominal pain, anal bleeding, blood in stool, constipation, diarrhea and rectal pain.   Endocrine: Negative for cold intolerance, heat intolerance, polydipsia, polyphagia and polyuria.   Genitourinary: Positive for decreased urine volume. Negative for difficulty urinating, dysuria, enuresis, flank pain, frequency, genital sores, hematuria and urgency.   Musculoskeletal: Negative for arthralgias, back pain, gait problem, joint swelling, myalgias, neck pain and neck stiffness.   Skin: Negative for color change, pallor, rash and wound.   Allergic/Immunologic: Negative for environmental allergies, food allergies and immunocompromised state.   Neurological: Positive for weakness. Negative for dizziness, tremors, seizures, syncope, facial asymmetry, speech difficulty, light-headedness, numbness and headaches.   Hematological: Negative for adenopathy. Does not bruise/bleed easily.   Psychiatric/Behavioral: Negative for agitation, behavioral problems, confusion, decreased concentration, dysphoric mood, hallucinations, self-injury, sleep disturbance and suicidal ideas. The patient is not nervous/anxious and is not hyperactive.           Past Medical History:   Diagnosis Date   • Arrhythmia     SVT   • Dehydration    • Hypothyroidism    • POTS (postural orthostatic tachycardia syndrome)        Past Surgical History:   Procedure Laterality Date   • ANKLE SURGERY     • CARDIAC ASSIST DEVICE INSERTION      Loop recorder   •  SECTION     • CHOLECYSTECTOMY     • FERTILITY SURGERY      IVF   • TONSILLECTOMY     • TUBAL ABDOMINAL LIGATION          Family History   Problem Relation Age of Onset   • Hypertension Mother    • Pulmonary embolism Father        Social History     Tobacco Use   • Smoking status: Never Smoker   • Smokeless tobacco: Never Used   Vaping Use   • Vaping Use: Never used   Substance Use Topics   • Alcohol use: Not Currently   • Drug use: Defer       Home Meds:   Medications Prior to Admission   Medication Sig Dispense Refill Last Dose   • Cholecalciferol 50 MCG (2000 UT) tablet       • fludrocortisone 0.1 MG tablet Take 0.05 mg by mouth Daily.      • guanFACINE HCl ER (INTUNIV) 1 MG tablet sustained-release 24 hour Take 2.5 mg by mouth Daily.      • levothyroxine (SYNTHROID, LEVOTHROID) 75 MCG tablet Take 1 tablet by mouth Daily.      • midodrine (PROAMATINE) 2.5 MG tablet Take 2.5 mg by mouth 3 (Three) Times a Day.      • propranolol (INDERAL) 10 MG tablet Take 10 mg by mouth 3 (Three) Times a Day.      • sodium chloride 500 MG half tablet           Scheduled Meds:  enoxaparin, 40 mg, Subcutaneous, Q24H  fludrocortisone, 0.05 mg, Oral, Daily  levothyroxine, 75 mcg, Oral, Q AM  midodrine, 2.5 mg, Oral, TID AC  propranolol, 10 mg, Oral, TID  sodium chloride, 3 mL, Intravenous, Q12H  sodium chloride, 1 g, Oral, Daily        Continuous Infusions:  Pharmacy to Dose enoxaparin (LOVENOX),   sodium chloride, 100 mL/hr, Last Rate: 100 mL/hr (05/06/22 1910)        PRN Meds:  •  acetaminophen **OR** acetaminophen **OR** acetaminophen  •  magnesium sulfate **OR** magnesium sulfate **OR** magnesium sulfate  •  ondansetron **OR** ondansetron  •  Pharmacy to Dose enoxaparin (LOVENOX)  •  potassium & sodium phosphates **OR** potassium & sodium phosphates  •  potassium chloride **OR** potassium chloride **OR** potassium chloride  •  sodium chloride    Allergies:  Metoprolol    OBJECTIVE    Vital Signs  Temp:  [98.7 °F (37.1 °C)] 98.7 °F (37.1 °C)  Heart Rate:  [90-94] 90  Resp:  [12-16] 16  BP: (101-123)/(70-83) 101/70    No intake/output data  recorded.  I/O last 3 completed shifts:  In: 480 [P.O.:480]  Out: -     Physical Exam:  General Appearance: alert, appears stated age and cooperative  Head: normocephalic, without obvious abnormality and atraumatic +DRY OP  Eyes: conjunctivae and sclerae normal and no icterus  Neck: supple and no JVD  Lungs: clear to auscultation and respirations regular  Heart: regular rhythm & normal rate and normal S1, S2  Chest Wall: no abnormalities observed  Abdomen: normal bowel sounds and soft +MILD BILATERAL LQ PAIN/SUPRAPUBIC PAIN ON PALPATION. NO GUARDING/REBOUND/CVA TENDERNESS  Extremities: moves extremities well, no edema, no cyanosis  Skin: no bleeding, bruising or rash +DECREASED SKIN TURGOR  Neurologic: Alert, and oriented. No focal deficits    Results Review:    I reviewed the patient's new clinical results.    WBC WBC   Date Value Ref Range Status   05/06/2022 3.90 3.40 - 10.80 10*3/mm3 Final      HGB Hemoglobin   Date Value Ref Range Status   05/06/2022 13.6 12.0 - 15.9 g/dL Final      HCT Hematocrit   Date Value Ref Range Status   05/06/2022 41.1 34.0 - 46.6 % Final      Platlets No results found for: LABPLAT   MCV MCV   Date Value Ref Range Status   05/06/2022 86.7 79.0 - 97.0 fL Final          Sodium Sodium   Date Value Ref Range Status   05/06/2022 138 136 - 145 mmol/L Final      Potassium Potassium   Date Value Ref Range Status   05/06/2022 4.5 3.5 - 5.2 mmol/L Final     Comment:     Slight hemolysis detected by analyzer. Results may be affected.      Chloride Chloride   Date Value Ref Range Status   05/06/2022 104 98 - 107 mmol/L Final      CO2 CO2   Date Value Ref Range Status   05/06/2022 25.0 22.0 - 29.0 mmol/L Final      BUN BUN   Date Value Ref Range Status   05/06/2022 8 6 - 20 mg/dL Final      Creatinine Creatinine   Date Value Ref Range Status   05/06/2022 0.61 0.57 - 1.00 mg/dL Final      Calcium Calcium   Date Value Ref Range Status   05/06/2022 8.1 (L) 8.6 - 10.5 mg/dL Final      PO4 No results  found for: CAPO4   Albumin Albumin   Date Value Ref Range Status   05/06/2022 3.60 3.50 - 5.20 g/dL Final      Magnesium Magnesium   Date Value Ref Range Status   05/06/2022 1.9 1.6 - 2.6 mg/dL Final      Uric Acid No results found for: URICACID       Imaging Results (Last 72 Hours)     ** No results found for the last 72 hours. **                      ASSESSMENT / PLAN      Dehydration    1. DEHYDRATION/INTRAVASCULAR VOLUME DEPLETION-------Normal renal function at present. Agree with hydration with NS    2. POTS-----On Midodrine and IVFs. Bump up Florinef and NaCL a little to both BID. Check AM Cortisol. Follow for need to increase Midodrine and consider addition of Pyridostigmine or Droxidopa or Ivabradine or Adderall.    3. H/O SVT/TACHYCARDIA------On Propranolol    4. HYPOTHYROIDISM-------On Synthroid. TSH normal. Check Free T4, and Free T3    5. HYPOPHOSPHATEMIA-------Replace po    6. HYPOMAGNESEMIA-------Replace IV    7. HYPOCALCEMIA------Replace IV and give little MgSO4 and follow ionized levels    8. VITAMIN D DEFICIENCY------On Supplementation        I discussed the patients findings and my recommendations with patient, family and nursing staff    Will follow along closely. Thank you for allowing us to see this patient in renal consultation.    [facility]  [phone]  MD Mallory Infante MD  05/06/22  19:16 EDT

## 2022-05-06 NOTE — H&P
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  Pt had been doing well with her outpatient IVF infusions 3x a week, in addition to her high salt diet.  She is compliant with her meds.  She states that her daughter has had a \"stomach bug\" this week.  Pt started having N/V and diarrhea early yesterday am.  She has not been able to keep anything down.  She did get her routine scheduled liter of LR fluids today but was still weak, dizzy and unable to ambulate without help.  It was decided to direct admit her for aggressive hydration.     Review of Systems   Constitutional: Positive for activity change, appetite change, fatigue and fever.   HENT: Positive for congestion.    Respiratory: Negative.    Cardiovascular: Negative.    Gastrointestinal: Positive for diarrhea, nausea and vomiting. Negative for abdominal pain.   Musculoskeletal: Positive for gait problem.   Neurological: Positive for dizziness and weakness.   Psychiatric/Behavioral: Negative for confusion. The patient is not nervous/anxious.           History  Past Medical History:   Diagnosis Date   • Arrhythmia     SVT   • Dehydration    • Hypothyroidism    • POTS (postural orthostatic tachycardia syndrome)      Past Surgical History:   Procedure Laterality Date   • ANKLE SURGERY     • CARDIAC ASSIST DEVICE INSERTION      Loop recorder   •  SECTION     • CHOLECYSTECTOMY     • FERTILITY SURGERY      IVF   • TONSILLECTOMY     • TUBAL ABDOMINAL LIGATION       Family History   Problem Relation Age of Onset   • Hypertension Mother    • Pulmonary embolism Father      Social History     Tobacco Use   • Smoking status: Never Smoker   • Smokeless tobacco: Never Used   Vaping Use   • Vaping Use: Never used   Substance Use Topics   • Alcohol use: Not Currently   • Drug use: Defer     Medications Prior to Admission "   Medication Sig Dispense Refill Last Dose   • Cholecalciferol 50 MCG (2000 UT) tablet       • fludrocortisone 0.1 MG tablet Take 0.05 mg by mouth Daily.      • guanFACINE HCl ER (INTUNIV) 1 MG tablet sustained-release 24 hour Take 2.5 mg by mouth Daily.      • levothyroxine (SYNTHROID, LEVOTHROID) 75 MCG tablet Take 1 tablet by mouth Daily.      • midodrine (PROAMATINE) 2.5 MG tablet Take 2.5 mg by mouth 3 (Three) Times a Day.      • propranolol (INDERAL) 10 MG tablet Take 10 mg by mouth 3 (Three) Times a Day.      • sodium chloride 500 MG half tablet         Allergies:  Metoprolol    Objective     Vital Signs  Temp:  [98.7 °F (37.1 °C)] 98.7 °F (37.1 °C)  Heart Rate:  [90-94] 90  Resp:  [12-16] 16  BP: (101-123)/(70-83) 101/70     Physical Exam:      General Appearance:    Alert, cooperative, in no acute distress   Head:    Normocephalic, without obvious abnormality, atraumatic   Eyes:            Lids and lashes normal, conjunctivae and sclerae normal, no   icterus, no pallor, corneas clear, PERRLA   Ears:    Ears appear intact with no abnormalities noted   Throat:   No oral lesions, no thrush, oral mucosa moist   Neck:   No adenopathy, supple, trachea midline, no thyromegaly, no   carotid bruit, no JVD   Lungs:     Clear to auscultation,respirations regular, even and                  unlabored    Heart:    Regular rhythm and normal rate, normal S1 and S2, no            murmur, no gallop, no rub, no click   Chest Wall:    No abnormalities observed   Abdomen:     Normal bowel sounds, no masses, no organomegaly, soft        non-tender, non-distended, no guarding, no rebound                tenderness   Extremities:   Moves all extremities well, no edema, no cyanosis, no             redness   Pulses:   Pulses palpable and equal bilaterally   Skin:   No bleeding, bruising or rash   Lymph nodes:   No palpable adenopathy   Neurologic:   Cranial nerves 2 - 12 grossly intact, sensation intact, DTR       present and equal  bilaterally       Results Review:     Imaging Results (Last 24 Hours)     ** No results found for the last 24 hours. **           Lab Results (last 24 hours)     Procedure Component Value Units Date/Time    TSH [[id number]]  (Normal) Collected: 05/06/22 1331    Specimen: Blood Updated: 05/06/22 1422     TSH 1.350 uIU/mL     Comprehensive Metabolic Panel [[id number]]  (Abnormal) Collected: 05/06/22 1331    Specimen: Blood Updated: 05/06/22 1417     Glucose 96 mg/dL      BUN 8 mg/dL      Creatinine 0.61 mg/dL      Sodium 138 mmol/L      Potassium 4.5 mmol/L      Comment: Slight hemolysis detected by analyzer. Results may be affected.        Chloride 104 mmol/L      CO2 25.0 mmol/L      Calcium 8.1 mg/dL      Total Protein 6.4 g/dL      Albumin 3.60 g/dL      ALT (SGPT) 11 U/L      AST (SGOT) 21 U/L      Alkaline Phosphatase 57 U/L      Total Bilirubin 0.6 mg/dL      Globulin 2.8 gm/dL      A/G Ratio 1.3 g/dL      BUN/Creatinine Ratio 13.1     Anion Gap 9.0 mmol/L      eGFR 121.2 mL/min/1.73      Comment: National Kidney Foundation and American Society of Nephrology (ASN) Task Force recommended calculation based on the Chronic Kidney Disease Epidemiology Collaboration (CKD-EPI) equation refit without adjustment for race.       Narrative:      GFR Normal >60  Chronic Kidney Disease <60  Kidney Failure <15      Phosphorus [[id number]]  (Abnormal) Collected: 05/06/22 1331    Specimen: Blood Updated: 05/06/22 1417     Phosphorus 2.3 mg/dL     Magnesium [[id number]]  (Normal) Collected: 05/06/22 1331    Specimen: Blood Updated: 05/06/22 1417     Magnesium 1.9 mg/dL     CBC Auto Differential [[id number]]  (Abnormal) Collected: 05/06/22 1331    Specimen: Blood Updated: 05/06/22 1355     WBC 3.90 10*3/mm3      RBC 4.74 10*6/mm3      Hemoglobin 13.6 g/dL      Hematocrit 41.1 %      MCV 86.7 fL      MCH 28.7 pg      MCHC 33.1 g/dL      RDW 13.4 %      RDW-SD 40.7 fl      MPV 6.9 fL      Platelets 220 10*3/mm3      Neutrophil % 61.8 %       Lymphocyte % 19.7 %      Monocyte % 15.1 %      Eosinophil % 3.1 %      Basophil % 0.3 %      Neutrophils, Absolute 2.40 10*3/mm3      Lymphocytes, Absolute 0.80 10*3/mm3      Monocytes, Absolute 0.60 10*3/mm3      Eosinophils, Absolute 0.10 10*3/mm3      Basophils, Absolute 0.00 10*3/mm3      nRBC 0.4 /100 WBC            I reviewed the patient's new clinical results.    Assessment/Plan       Dehydration  - admit for IVF.      Gastroenteritis  - IVF.  zofran for N/V.   - will check stool cultures  - check for COVID-19    Pott's syndrome  - consult renal  - continue home meds    Hypophosphatemia  - replace    Hypocalcemia  - check ionized calcium    Hypothyroid  - TSH is normal  - continue home meds        I discussed the patients findings and my recommendations with patient.     Justine Watts MD  05/06/22  16:40 EDT

## 2022-05-06 NOTE — PLAN OF CARE
Goal Outcome Evaluation:  Pt direct admit. Pt alert and oriented. Pt denies pain/soa at this time.

## 2022-05-07 VITALS
RESPIRATION RATE: 16 BRPM | BODY MASS INDEX: 28.02 KG/M2 | HEART RATE: 69 BPM | OXYGEN SATURATION: 99 % | HEIGHT: 71 IN | DIASTOLIC BLOOD PRESSURE: 83 MMHG | TEMPERATURE: 97.8 F | WEIGHT: 200.18 LBS | SYSTOLIC BLOOD PRESSURE: 120 MMHG

## 2022-05-07 LAB
ANION GAP SERPL CALCULATED.3IONS-SCNC: 7 MMOL/L (ref 5–15)
BASOPHILS # BLD AUTO: 0 10*3/MM3 (ref 0–0.2)
BASOPHILS NFR BLD AUTO: 0.8 % (ref 0–1.5)
BUN SERPL-MCNC: 6 MG/DL (ref 6–20)
BUN/CREAT SERPL: 9.5 (ref 7–25)
CA-I SERPL ISE-MCNC: 1.16 MMOL/L (ref 1.2–1.3)
CALCIUM SPEC-SCNC: 8 MG/DL (ref 8.6–10.5)
CHLORIDE SERPL-SCNC: 105 MMOL/L (ref 98–107)
CO2 SERPL-SCNC: 27 MMOL/L (ref 22–29)
CORTIS SERPL-MCNC: 11.72 MCG/DL
CREAT SERPL-MCNC: 0.63 MG/DL (ref 0.57–1)
DEPRECATED RDW RBC AUTO: 38.9 FL (ref 37–54)
EGFRCR SERPLBLD CKD-EPI 2021: 120.3 ML/MIN/1.73
EOSINOPHIL # BLD AUTO: 0.1 10*3/MM3 (ref 0–0.4)
EOSINOPHIL NFR BLD AUTO: 3.4 % (ref 0.3–6.2)
ERYTHROCYTE [DISTWIDTH] IN BLOOD BY AUTOMATED COUNT: 13 % (ref 12.3–15.4)
GLUCOSE SERPL-MCNC: 100 MG/DL (ref 65–99)
HCT VFR BLD AUTO: 37.1 % (ref 34–46.6)
HGB BLD-MCNC: 12.7 G/DL (ref 12–15.9)
LYMPHOCYTES # BLD AUTO: 0.9 10*3/MM3 (ref 0.7–3.1)
LYMPHOCYTES NFR BLD AUTO: 22.1 % (ref 19.6–45.3)
MAGNESIUM SERPL-MCNC: 2 MG/DL (ref 1.6–2.6)
MCH RBC QN AUTO: 29.1 PG (ref 26.6–33)
MCHC RBC AUTO-ENTMCNC: 34.3 G/DL (ref 31.5–35.7)
MCV RBC AUTO: 84.9 FL (ref 79–97)
MONOCYTES # BLD AUTO: 0.5 10*3/MM3 (ref 0.1–0.9)
MONOCYTES NFR BLD AUTO: 13.2 % (ref 5–12)
NEUTROPHILS NFR BLD AUTO: 2.4 10*3/MM3 (ref 1.7–7)
NEUTROPHILS NFR BLD AUTO: 60.5 % (ref 42.7–76)
NRBC BLD AUTO-RTO: 0.1 /100 WBC (ref 0–0.2)
PHOSPHATE SERPL-MCNC: 2.9 MG/DL (ref 2.5–4.5)
PLATELET # BLD AUTO: 196 10*3/MM3 (ref 140–450)
PMV BLD AUTO: 6.3 FL (ref 6–12)
POTASSIUM SERPL-SCNC: 4.3 MMOL/L (ref 3.5–5.2)
RBC # BLD AUTO: 4.37 10*6/MM3 (ref 3.77–5.28)
SODIUM SERPL-SCNC: 139 MMOL/L (ref 136–145)
T3FREE SERPL-MCNC: 2.34 PG/ML (ref 2–4.4)
T4 FREE SERPL-MCNC: 1.47 NG/DL (ref 0.93–1.7)
WBC NRBC COR # BLD: 4 10*3/MM3 (ref 3.4–10.8)

## 2022-05-07 PROCEDURE — 87046 STOOL CULTR AEROBIC BACT EA: CPT | Performed by: FAMILY MEDICINE

## 2022-05-07 PROCEDURE — 25010000002 CALCIUM GLUCONATE 2-0.675 GM/100ML-% SOLUTION: Performed by: INTERNAL MEDICINE

## 2022-05-07 PROCEDURE — 97162 PT EVAL MOD COMPLEX 30 MIN: CPT

## 2022-05-07 PROCEDURE — 87427 SHIGA-LIKE TOXIN AG IA: CPT | Performed by: FAMILY MEDICINE

## 2022-05-07 PROCEDURE — 83735 ASSAY OF MAGNESIUM: CPT | Performed by: FAMILY MEDICINE

## 2022-05-07 PROCEDURE — 82330 ASSAY OF CALCIUM: CPT | Performed by: FAMILY MEDICINE

## 2022-05-07 PROCEDURE — 96366 THER/PROPH/DIAG IV INF ADDON: CPT

## 2022-05-07 PROCEDURE — 85025 COMPLETE CBC W/AUTO DIFF WBC: CPT | Performed by: FAMILY MEDICINE

## 2022-05-07 PROCEDURE — 82533 TOTAL CORTISOL: CPT | Performed by: INTERNAL MEDICINE

## 2022-05-07 PROCEDURE — 84100 ASSAY OF PHOSPHORUS: CPT | Performed by: FAMILY MEDICINE

## 2022-05-07 PROCEDURE — 87045 FECES CULTURE AEROBIC BACT: CPT | Performed by: FAMILY MEDICINE

## 2022-05-07 PROCEDURE — G0378 HOSPITAL OBSERVATION PER HR: HCPCS

## 2022-05-07 PROCEDURE — 80048 BASIC METABOLIC PNL TOTAL CA: CPT | Performed by: FAMILY MEDICINE

## 2022-05-07 PROCEDURE — 84481 FREE ASSAY (FT-3): CPT | Performed by: INTERNAL MEDICINE

## 2022-05-07 PROCEDURE — 84439 ASSAY OF FREE THYROXINE: CPT | Performed by: INTERNAL MEDICINE

## 2022-05-07 RX ORDER — CALCIUM GLUCONATE 20 MG/ML
2 INJECTION, SOLUTION INTRAVENOUS ONCE
Status: COMPLETED | OUTPATIENT
Start: 2022-05-07 | End: 2022-05-07

## 2022-05-07 RX ORDER — ONDANSETRON 4 MG/1
4 TABLET, FILM COATED ORAL EVERY 6 HOURS PRN
Qty: 20 TABLET | Refills: 0 | Status: SHIPPED | OUTPATIENT
Start: 2022-05-07

## 2022-05-07 RX ADMIN — Medication 1 G: at 09:29

## 2022-05-07 RX ADMIN — MIDODRINE HYDROCHLORIDE 2.5 MG: 5 TABLET ORAL at 09:28

## 2022-05-07 RX ADMIN — Medication 3 ML: at 09:34

## 2022-05-07 RX ADMIN — CALCIUM GLUCONATE 2 G: 20 INJECTION, SOLUTION INTRAVENOUS at 09:29

## 2022-05-07 RX ADMIN — FLUDROCORTISONE ACETATE 0.05 MG: 0.1 TABLET ORAL at 09:28

## 2022-05-07 RX ADMIN — MIDODRINE HYDROCHLORIDE 2.5 MG: 5 TABLET ORAL at 12:05

## 2022-05-07 RX ADMIN — SODIUM CHLORIDE 1000 ML: 9 INJECTION, SOLUTION INTRAVENOUS at 12:01

## 2022-05-07 RX ADMIN — LEVOTHYROXINE SODIUM 75 MCG: 0.07 TABLET ORAL at 06:03

## 2022-05-07 NOTE — DISCHARGE SUMMARY
Date of Discharge:  5/7/2022    Discharge Diagnosis:   Gastritises  Dehydrations  Postural orthostatic tachycardia syndrome  Hypothyroid    Presenting Problem/History of Present Illness  Active Hospital Problems    Diagnosis  POA   • Dehydration [E86.0]  Yes      Resolved Hospital Problems   No resolved problems to display.          Hospital Course  Patient is a 32 y.o. female presented with dehydration most likely related to a gastrointestinal bug transmitted from her children. Patient had nausea and vomiting with diarrhea and SIMPSON disease. She was hydrated, physical therapy worked with her and gave her suggestions on to avoid hypotension. Electrolytes are corrected. She has an infusion on Monday. She will get a liter of fluids and be discharged. She is tolerating food with minor nausea and no more diarrhea. She is hemodynamically stable and will follow up with PCP as needed.     Procedures Performed         Consults:   Consults     Date and Time Order Name Status Description    5/6/2022  1:23 PM Inpatient Nephrology Consult Completed           Pertinent Test Results:    Lab Results (most recent)     Procedure Component Value Units Date/Time    Phosphorus [[id number]]  (Normal) Collected: 05/07/22 0605    Specimen: Blood Updated: 05/07/22 0650     Phosphorus 2.9 mg/dL     Cortisol [[id number]] Collected: 05/07/22 0605    Specimen: Blood Updated: 05/07/22 0648     Cortisol 11.72 mcg/dL     Narrative:      Cortisol Reference Ranges:    Cortisol 6AM - 10AM Range: 6.02-18.40 mcg/dl  Cortisol 4PM - 8PM Range: 2.68-10.50 mcg/dl      Results may be falsely increased if patient taking Biotin.      T4, Free [[id number]]  (Normal) Collected: 05/07/22 0605    Specimen: Blood Updated: 05/07/22 0648     Free T4 1.47 ng/dL     Narrative:      Results may be falsely increased if patient taking Biotin.      Basic Metabolic Panel [[id number]]  (Abnormal) Collected: 05/07/22 0605    Specimen: Blood Updated: 05/07/22 0645     Glucose  100 mg/dL      BUN 6 mg/dL      Creatinine 0.63 mg/dL      Sodium 139 mmol/L      Potassium 4.3 mmol/L      Chloride 105 mmol/L      CO2 27.0 mmol/L      Calcium 8.0 mg/dL      BUN/Creatinine Ratio 9.5     Anion Gap 7.0 mmol/L      eGFR 120.3 mL/min/1.73      Comment: National Kidney Foundation and American Society of Nephrology (ASN) Task Force recommended calculation based on the Chronic Kidney Disease Epidemiology Collaboration (CKD-EPI) equation refit without adjustment for race.       Narrative:      GFR Normal >60  Chronic Kidney Disease <60  Kidney Failure <15      Magnesium [[id number]]  (Normal) Collected: 05/07/22 0605    Specimen: Blood Updated: 05/07/22 0645     Magnesium 2.0 mg/dL     Calcium, Ionized [[id number]]  (Abnormal) Collected: 05/07/22 0605    Specimen: Blood Updated: 05/07/22 0634     Ionized Calcium 1.16 mmol/L     CBC & Differential [[id number]]  (Abnormal) Collected: 05/07/22 0605    Specimen: Blood Updated: 05/07/22 0625    Narrative:      The following orders were created for panel order CBC & Differential.  Procedure                               Abnormality         Status                     ---------                               -----------         ------                     CBC Auto Differential[[id number]]        Abnormal            Final result                 Please view results for these tests on the individual orders.    CBC Auto Differential [[id number]]  (Abnormal) Collected: 05/07/22 0605    Specimen: Blood Updated: 05/07/22 0625     WBC 4.00 10*3/mm3      RBC 4.37 10*6/mm3      Hemoglobin 12.7 g/dL      Hematocrit 37.1 %      MCV 84.9 fL      MCH 29.1 pg      MCHC 34.3 g/dL      RDW 13.0 %      RDW-SD 38.9 fl      MPV 6.3 fL      Platelets 196 10*3/mm3      Neutrophil % 60.5 %      Lymphocyte % 22.1 %      Monocyte % 13.2 %      Eosinophil % 3.4 %      Basophil % 0.8 %      Neutrophils, Absolute 2.40 10*3/mm3      Lymphocytes, Absolute 0.90 10*3/mm3      Monocytes, Absolute 0.50  10*3/mm3      Eosinophils, Absolute 0.10 10*3/mm3      Basophils, Absolute 0.00 10*3/mm3      nRBC 0.1 /100 WBC     T3, Free [[id number]] Collected: 05/07/22 0605    Specimen: Blood Updated: 05/07/22 0618    Stool Culture (Reference Lab) - Stool, Per Rectum [[id number]] Collected: 05/07/22 0415    Specimen: Stool from Per Rectum Updated: 05/07/22 0430    COVID PRE-OP / PRE-PROCEDURE SCREENING ORDER (NO ISOLATION) - Swab, Nasopharynx [[id number]]  (Normal) Collected: 05/06/22 1641    Specimen: Swab from Nasopharynx Updated: 05/06/22 1721    Narrative:      The following orders were created for panel order COVID PRE-OP / PRE-PROCEDURE SCREENING ORDER (NO ISOLATION) - Swab, Nasopharynx.  Procedure                               Abnormality         Status                     ---------                               -----------         ------                     COVID-19,CEPHEID/DMITRI,CO...[[id number]]  Normal              Final result                 Please view results for these tests on the individual orders.    COVID-19,CEPHEID/DMITRI,COR/[facility]/PAD/LUIS IN-HOUSE(OR EMERGENT/ADD-ON),NP SWAB IN TRANSPORT MEDIA 3-4 HR TAT, RT-PCR - Swab, Nasopharynx [[id number]]  (Normal) Collected: 05/06/22 1641    Specimen: Swab from Nasopharynx Updated: 05/06/22 1721     COVID19 Not Detected    Narrative:      Fact sheet for providers: https://www.fda.gov/media/749175/download     Fact sheet for patients: https://www.fda.gov/media/340820/download  Fact sheet for providers: https://www.fda.gov/media/419509/download    Fact sheet for patients: https://www.fda.gov/media/797486/download    Test performed by PCR.    TSH [[id number]]  (Normal) Collected: 05/06/22 1331    Specimen: Blood Updated: 05/06/22 1422     TSH 1.350 uIU/mL     Comprehensive Metabolic Panel [[id number]]  (Abnormal) Collected: 05/06/22 1331    Specimen: Blood Updated: 05/06/22 1417     Glucose 96 mg/dL      BUN 8 mg/dL      Creatinine 0.61 mg/dL      Sodium 138 mmol/L      Potassium  4.5 mmol/L      Comment: Slight hemolysis detected by analyzer. Results may be affected.        Chloride 104 mmol/L      CO2 25.0 mmol/L      Calcium 8.1 mg/dL      Total Protein 6.4 g/dL      Albumin 3.60 g/dL      ALT (SGPT) 11 U/L      AST (SGOT) 21 U/L      Alkaline Phosphatase 57 U/L      Total Bilirubin 0.6 mg/dL      Globulin 2.8 gm/dL      A/G Ratio 1.3 g/dL      BUN/Creatinine Ratio 13.1     Anion Gap 9.0 mmol/L      eGFR 121.2 mL/min/1.73      Comment: National Kidney Foundation and American Society of Nephrology (ASN) Task Force recommended calculation based on the Chronic Kidney Disease Epidemiology Collaboration (CKD-EPI) equation refit without adjustment for race.       Narrative:      GFR Normal >60  Chronic Kidney Disease <60  Kidney Failure <15      Phosphorus [[id number]]  (Abnormal) Collected: 05/06/22 1331    Specimen: Blood Updated: 05/06/22 1417     Phosphorus 2.3 mg/dL     Magnesium [[id number]]  (Normal) Collected: 05/06/22 1331    Specimen: Blood Updated: 05/06/22 1417     Magnesium 1.9 mg/dL     CBC Auto Differential [[id number]]  (Abnormal) Collected: 05/06/22 1331    Specimen: Blood Updated: 05/06/22 1355     WBC 3.90 10*3/mm3      RBC 4.74 10*6/mm3      Hemoglobin 13.6 g/dL      Hematocrit 41.1 %      MCV 86.7 fL      MCH 28.7 pg      MCHC 33.1 g/dL      RDW 13.4 %      RDW-SD 40.7 fl      MPV 6.9 fL      Platelets 220 10*3/mm3      Neutrophil % 61.8 %      Lymphocyte % 19.7 %      Monocyte % 15.1 %      Eosinophil % 3.1 %      Basophil % 0.3 %      Neutrophils, Absolute 2.40 10*3/mm3      Lymphocytes, Absolute 0.80 10*3/mm3      Monocytes, Absolute 0.60 10*3/mm3      Eosinophils, Absolute 0.10 10*3/mm3      Basophils, Absolute 0.00 10*3/mm3      nRBC 0.4 /100 WBC                 Condition on Discharge:  Stable    Vital Signs  Temp:  [97.8 °F (36.6 °C)-98.7 °F (37.1 °C)] 97.8 °F (36.6 °C)  Heart Rate:  [79-90] 89  Resp:  [16-18] 18  BP: (101-119)/(70-81) 119/79      Physical Exam  Vitals  reviewed.   Constitutional:       Appearance: She is not ill-appearing.   HENT:      Head: Normocephalic and atraumatic.      Right Ear: External ear normal.      Left Ear: External ear normal.      Nose: Nose normal.      Mouth/Throat:      Mouth: Mucous membranes are moist.   Eyes:      General:         Right eye: No discharge.         Left eye: No discharge.   Cardiovascular:      Rate and Rhythm: Normal rate and regular rhythm.      Pulses: Normal pulses.      Heart sounds: Normal heart sounds.   Pulmonary:      Effort: Pulmonary effort is normal.      Breath sounds: Normal breath sounds.   Abdominal:      General: Bowel sounds are normal.      Palpations: Abdomen is soft.   Musculoskeletal:         General: Normal range of motion.      Cervical back: Normal range of motion.   Skin:     General: Skin is warm and dry.   Neurological:      Mental Status: She is alert and oriented to person, place, and time.   Psychiatric:         Behavior: Behavior normal.              Discharge Disposition  Home or Self Care    Discharge Medications     Discharge Medications      New Medications      Instructions Start Date   ondansetron 4 MG tablet  Commonly known as: ZOFRAN   4 mg, Oral, Every 6 Hours PRN         Continue These Medications      Instructions Start Date   Cholecalciferol 50 MCG (2000 UT) tablet   No dose, route, or frequency recorded.      fludrocortisone 0.1 MG tablet   0.05 mg, Oral, Daily      guanFACINE HCl ER 1 MG tablet sustained-release 24 hour  Commonly known as: INTUNIV   2.5 mg, Oral, Daily      levothyroxine 75 MCG tablet  Commonly known as: SYNTHROID, LEVOTHROID   1 tablet, Oral, Daily      midodrine 2.5 MG tablet  Commonly known as: PROAMATINE   2.5 mg, Oral, 3 Times Daily      propranolol 10 MG tablet  Commonly known as: INDERAL   10 mg, Oral, 3 Times Daily      sodium chloride 500 MG half tablet   No dose, route, or frequency recorded.             Discharge Diet:   Diet Instructions     Diet:  Regular      Discharge Diet: Regular          Activity at Discharge:   Activity Instructions     Activity as Tolerated            Follow-up Appointments  Future Appointments   Date Time Provider Department Center   5/9/2022 11:00 AM ROOM 07 [facility] None   5/11/2022  9:30 AM ROOM 07 [facility] None   5/13/2022  9:00 AM ROOM 07 [facility] None     Additional Instructions for the Follow-ups that You Need to Schedule     Discharge Follow-up with PCP   As directed       Currently Documented PCP:    Justine Watts MD    PCP Phone Number:    [phone]     Follow Up Details: as needed               Test Results Pending at Discharge  Pending Labs     Order Current Status    Stool Culture (Reference Lab) - Stool, Per Rectum In process    T3, Free In process           ZHOU Shirley  05/07/22  11:45 EDT    Time: Discharge 20 min

## 2022-05-07 NOTE — DISCHARGE PLACEMENT REQUEST
"Deepa Garcia (32 y.o. Female)             Date of Birth   1990    Social Security Number       Address   [address] [place] [address]    Home Phone   [phone]    MRN   [id number]       Sikh   None    Marital Status                               Admission Date   5/6/22    Admission Type   Urgent    Admitting Provider   Justine Watts MD    Attending Provider   Justine Watts MD    Department, Room/Bed   [facility] 2B MEDICAL INPATIENT, 226/1       Discharge Date       Discharge Disposition   Home or Self Care    Discharge Destination                               Attending Provider: Justine Watts MD    Allergies: Metoprolol    Isolation: None   Infection: None   Code Status: CPR   Advance Care Planning Activity    Ht: 180.3 cm (71\")   Wt: 90.8 kg (200 lb 2.8 oz)    Admission Cmt: None   Principal Problem: None                Active Insurance as of 5/6/2022     Primary Coverage     Payor Plan Insurance Group Employer/Plan Group    HUMANA HUMANA [id number]     Payor Plan Address Payor Plan Phone Number Payor Plan Fax Number Effective Dates    [address] [address] [phone]  7/1/2020 - None Entered    Formerly [facility] [id number]       Subscriber Name Subscriber Birth Date Member ID       CAYDEN GARCIA 1990 [id number]                 Emergency Contacts      (Rel.) Home Phone Work Phone Mobile Phone    cayden garcia -- -- [phone]               History & Physical      Justine Watts MD at 05/06/22 1639          Patient Care Team:  Justine Watts MD as PCP - General (Family Medicine)    Chief complaint dehydration    Subjective     31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  Pt had been doing well with her outpatient IVF infusions 3x a week, in addition to her high salt diet.  She is compliant with her meds.  She states that her daughter has had a \"stomach bug\" this week.  Pt started having N/V and diarrhea early " yesterday am.  She has not been able to keep anything down.  She did get her routine scheduled liter of LR fluids today but was still weak, dizzy and unable to ambulate without help.  It was decided to direct admit her for aggressive hydration.     Review of Systems   Constitutional: Positive for activity change, appetite change, fatigue and fever.   HENT: Positive for congestion.    Respiratory: Negative.    Cardiovascular: Negative.    Gastrointestinal: Positive for diarrhea, nausea and vomiting. Negative for abdominal pain.   Musculoskeletal: Positive for gait problem.   Neurological: Positive for dizziness and weakness.   Psychiatric/Behavioral: Negative for confusion. The patient is not nervous/anxious.           History  Past Medical History:   Diagnosis Date   • Arrhythmia     SVT   • Dehydration    • Hypothyroidism    • POTS (postural orthostatic tachycardia syndrome)      Past Surgical History:   Procedure Laterality Date   • ANKLE SURGERY     • CARDIAC ASSIST DEVICE INSERTION      Loop recorder   •  SECTION     • CHOLECYSTECTOMY     • FERTILITY SURGERY      IVF   • TONSILLECTOMY     • TUBAL ABDOMINAL LIGATION       Family History   Problem Relation Age of Onset   • Hypertension Mother    • Pulmonary embolism Father      Social History     Tobacco Use   • Smoking status: Never Smoker   • Smokeless tobacco: Never Used   Vaping Use   • Vaping Use: Never used   Substance Use Topics   • Alcohol use: Not Currently   • Drug use: Defer     Medications Prior to Admission   Medication Sig Dispense Refill Last Dose   • Cholecalciferol 50 MCG (2000 UT) tablet       • fludrocortisone 0.1 MG tablet Take 0.05 mg by mouth Daily.      • guanFACINE HCl ER (INTUNIV) 1 MG tablet sustained-release 24 hour Take 2.5 mg by mouth Daily.      • levothyroxine (SYNTHROID, LEVOTHROID) 75 MCG tablet Take 1 tablet by mouth Daily.      • midodrine (PROAMATINE) 2.5 MG tablet Take 2.5 mg by mouth 3 (Three) Times a Day.      •  propranolol (INDERAL) 10 MG tablet Take 10 mg by mouth 3 (Three) Times a Day.      • sodium chloride 500 MG half tablet         Allergies:  Metoprolol    Objective     Vital Signs  Temp:  [98.7 °F (37.1 °C)] 98.7 °F (37.1 °C)  Heart Rate:  [90-94] 90  Resp:  [12-16] 16  BP: (101-123)/(70-83) 101/70     Physical Exam:      General Appearance:    Alert, cooperative, in no acute distress   Head:    Normocephalic, without obvious abnormality, atraumatic   Eyes:            Lids and lashes normal, conjunctivae and sclerae normal, no   icterus, no pallor, corneas clear, PERRLA   Ears:    Ears appear intact with no abnormalities noted   Throat:   No oral lesions, no thrush, oral mucosa moist   Neck:   No adenopathy, supple, trachea midline, no thyromegaly, no   carotid bruit, no JVD   Lungs:     Clear to auscultation,respirations regular, even and                  unlabored    Heart:    Regular rhythm and normal rate, normal S1 and S2, no            murmur, no gallop, no rub, no click   Chest Wall:    No abnormalities observed   Abdomen:     Normal bowel sounds, no masses, no organomegaly, soft        non-tender, non-distended, no guarding, no rebound                tenderness   Extremities:   Moves all extremities well, no edema, no cyanosis, no             redness   Pulses:   Pulses palpable and equal bilaterally   Skin:   No bleeding, bruising or rash   Lymph nodes:   No palpable adenopathy   Neurologic:   Cranial nerves 2 - 12 grossly intact, sensation intact, DTR       present and equal bilaterally       Results Review:     Imaging Results (Last 24 Hours)     ** No results found for the last 24 hours. **           Lab Results (last 24 hours)     Procedure Component Value Units Date/Time    TSH [[id number]]  (Normal) Collected: 05/06/22 1331    Specimen: Blood Updated: 05/06/22 1422     TSH 1.350 uIU/mL     Comprehensive Metabolic Panel [[id number]]  (Abnormal) Collected: 05/06/22 1331    Specimen: Blood Updated: 05/06/22  1417     Glucose 96 mg/dL      BUN 8 mg/dL      Creatinine 0.61 mg/dL      Sodium 138 mmol/L      Potassium 4.5 mmol/L      Comment: Slight hemolysis detected by analyzer. Results may be affected.        Chloride 104 mmol/L      CO2 25.0 mmol/L      Calcium 8.1 mg/dL      Total Protein 6.4 g/dL      Albumin 3.60 g/dL      ALT (SGPT) 11 U/L      AST (SGOT) 21 U/L      Alkaline Phosphatase 57 U/L      Total Bilirubin 0.6 mg/dL      Globulin 2.8 gm/dL      A/G Ratio 1.3 g/dL      BUN/Creatinine Ratio 13.1     Anion Gap 9.0 mmol/L      eGFR 121.2 mL/min/1.73      Comment: National Kidney Foundation and American Society of Nephrology (ASN) Task Force recommended calculation based on the Chronic Kidney Disease Epidemiology Collaboration (CKD-EPI) equation refit without adjustment for race.       Narrative:      GFR Normal >60  Chronic Kidney Disease <60  Kidney Failure <15      Phosphorus [[id number]]  (Abnormal) Collected: 05/06/22 1331    Specimen: Blood Updated: 05/06/22 1417     Phosphorus 2.3 mg/dL     Magnesium [[id number]]  (Normal) Collected: 05/06/22 1331    Specimen: Blood Updated: 05/06/22 1417     Magnesium 1.9 mg/dL     CBC Auto Differential [[id number]]  (Abnormal) Collected: 05/06/22 1331    Specimen: Blood Updated: 05/06/22 1355     WBC 3.90 10*3/mm3      RBC 4.74 10*6/mm3      Hemoglobin 13.6 g/dL      Hematocrit 41.1 %      MCV 86.7 fL      MCH 28.7 pg      MCHC 33.1 g/dL      RDW 13.4 %      RDW-SD 40.7 fl      MPV 6.9 fL      Platelets 220 10*3/mm3      Neutrophil % 61.8 %      Lymphocyte % 19.7 %      Monocyte % 15.1 %      Eosinophil % 3.1 %      Basophil % 0.3 %      Neutrophils, Absolute 2.40 10*3/mm3      Lymphocytes, Absolute 0.80 10*3/mm3      Monocytes, Absolute 0.60 10*3/mm3      Eosinophils, Absolute 0.10 10*3/mm3      Basophils, Absolute 0.00 10*3/mm3      nRBC 0.4 /100 WBC            I reviewed the patient's new clinical results.    Assessment/Plan       Dehydration  - admit for IVF.   "    Gastroenteritis  - IVF.  zofran for N/V.   - will check stool cultures  - check for COVID-19    Pott's syndrome  - consult renal  - continue home meds    Hypophosphatemia  - replace    Hypocalcemia  - check ionized calcium    Hypothyroid  - TSH is normal  - continue home meds        I discussed the patients findings and my recommendations with patient.     Justine Watts MD  22  16:40 EDT        Electronically signed by Justine Watts MD at 22 1647          Physician Progress Notes (last 24 hours)      Moi Pryor MD at 22 0733          NEPHROLOGY PROGRESS NOTE------[facility]    [facility]  [phone]  Mallory Pryor MD      Patient Care Team:  Justine Watts MD as PCP - General (Family Medicine)  John Phan MD as Consulting Physician (Nephrology)      Provider:  Mallory Pryor MD  Patient Name: Deepa Garcia  :  1990    SUBJECTIVE:    F/U DEHYDRATION/ELECTROLYTE ABNORMALITIES    Feeling better overall this AM. Still some occasional palpitations. No angina. No dysuria.     Medication:  enoxaparin, 40 mg, Subcutaneous, Q24H  fludrocortisone, 0.05 mg, Oral, Q12H  levothyroxine, 75 mcg, Oral, Q AM  midodrine, 2.5 mg, Oral, TID AC  propranolol, 10 mg, Oral, TID  sodium chloride, 3 mL, Intravenous, Q12H  sodium chloride, 1 g, Oral, BID With Meals      Pharmacy to Dose enoxaparin (LOVENOX),   sodium chloride, 100 mL/hr, Last Rate: 100 mL/hr (22 0559)        OBJECTIVE    Vital Sign Min/Max for last 24 hours  Temp  Min: 97.8 °F (36.6 °C)  Max: 98.7 °F (37.1 °C)   BP  Min: 101/70  Max: 123/83   Pulse  Min: 79  Max: 94   Resp  Min: 12  Max: 18   SpO2  Min: 97 %  Max: 98 %   No data recorded   Weight  Min: 90.8 kg (200 lb 2.8 oz)  Max: 90.8 kg (200 lb 2.8 oz)     Flowsheet Rows    Flowsheet Row First Filed Value   Admission Height 180.3 cm (71\") Documented at 2022 1500   Admission " Weight 90.8 kg (200 lb 2.8 oz) Documented at 05/06/2022 1500          No intake/output data recorded.  I/O last 3 completed shifts:  In: 480 [P.O.:480]  Out: -     Physical Exam:  General Appearance: alert, appears stated age and cooperative  Head: normocephalic, without obvious abnormality and atraumatic  Eyes: conjunctivae and sclerae normal and no icterus  Neck: supple and no JVD  Lungs: clear to auscultation and respirations regular  Heart: regular rhythm & normal rate and normal S1, S2 +POORNIMA  Chest: Wall no abnormalities observed  Abdomen: normal bowel sounds and soft non-tender  Extremities: moves extremities well, no edema, no cyanosis and no redness  Skin: no bleeding, bruising or rash, turgor normal, color normal and no lesions noted  Neurologic: Alert, and oriented. No focal deficits    Labs:    WBC WBC   Date Value Ref Range Status   05/07/2022 4.00 3.40 - 10.80 10*3/mm3 Final   05/06/2022 3.90 3.40 - 10.80 10*3/mm3 Final      HGB Hemoglobin   Date Value Ref Range Status   05/07/2022 12.7 12.0 - 15.9 g/dL Final   05/06/2022 13.6 12.0 - 15.9 g/dL Final      HCT Hematocrit   Date Value Ref Range Status   05/07/2022 37.1 34.0 - 46.6 % Final   05/06/2022 41.1 34.0 - 46.6 % Final      Platlets No results found for: LABPLAT   MCV MCV   Date Value Ref Range Status   05/07/2022 84.9 79.0 - 97.0 fL Final   05/06/2022 86.7 79.0 - 97.0 fL Final          Sodium Sodium   Date Value Ref Range Status   05/07/2022 139 136 - 145 mmol/L Final   05/06/2022 138 136 - 145 mmol/L Final      Potassium Potassium   Date Value Ref Range Status   05/07/2022 4.3 3.5 - 5.2 mmol/L Final   05/06/2022 4.5 3.5 - 5.2 mmol/L Final     Comment:     Slight hemolysis detected by analyzer. Results may be affected.      Chloride Chloride   Date Value Ref Range Status   05/07/2022 105 98 - 107 mmol/L Final   05/06/2022 104 98 - 107 mmol/L Final      CO2 CO2   Date Value Ref Range Status   05/07/2022 27.0 22.0 - 29.0 mmol/L Final   05/06/2022 25.0  22.0 - 29.0 mmol/L Final      BUN BUN   Date Value Ref Range Status   05/07/2022 6 6 - 20 mg/dL Final   05/06/2022 8 6 - 20 mg/dL Final      Creatinine Creatinine   Date Value Ref Range Status   05/07/2022 0.63 0.57 - 1.00 mg/dL Final   05/06/2022 0.61 0.57 - 1.00 mg/dL Final      Calcium Calcium   Date Value Ref Range Status   05/07/2022 8.0 (L) 8.6 - 10.5 mg/dL Final   05/06/2022 8.1 (L) 8.6 - 10.5 mg/dL Final      PO4 No components found for: PO4   Albumin Albumin   Date Value Ref Range Status   05/06/2022 3.60 3.50 - 5.20 g/dL Final      Magnesium Magnesium   Date Value Ref Range Status   05/07/2022 2.0 1.6 - 2.6 mg/dL Final   05/06/2022 1.9 1.6 - 2.6 mg/dL Final      Uric Acid No components found for: URIC ACID     Imaging Results (Last 72 Hours)     ** No results found for the last 72 hours. **                    ASSESSMENT / PLAN      Dehydration    1. DEHYDRATION/INTRAVASCULAR VOLUME DEPLETION-------Normal renal function at present. Agree with hydration with NS     2. POTS-----On Midodrine and IVFs. Bumped up Florinef and NaCL a little to both BID. Cortisol and thyroid function levels okay. Follow for need to increase Midodrine and consider addition of Pyridostigmine or Droxidopa or Ivabradine or Adderall. Counseled patient on use of LORA hose too     3. H/O SVT/TACHYCARDIA------On Propranolol     4. HYPOTHYROIDISM-------On Synthroid. TSH normal. Free T4 and Free T3 normal too     5. HYPOPHOSPHATEMIA-------Replaced     6. HYPOMAGNESEMIA-------Replace IV     7. HYPOCALCEMIA------Replaced     8. VITAMIN D DEFICIENCY------On Supplementation    Okay from RENAL standpoint to d/c and f/u as an outpatient as already scheduled    Mallory Pryor MD  [facility]  [phone]  05/07/22  07:29 EDT      Electronically signed by Moi Pryor MD at 05/07/22 0856          Physical Therapy Notes (last 24 hours)      Maggi Keenan, PT at 05/07/22 1027  Version 1 of 1        Goal Outcome Evaluation:  Plan of Care Reviewed With: spouse, patient           Outcome Evaluation: Pt is a 31 yo F who presents to [facility] with dehydration. 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 Pt with exacerbation of Hart sx, and very high HH during transfers and ambulation. Educated patient on increasing time during transfers, and on wearing an abdominal binder to increase intra-abdominal pressure. Pt practiced taking increased time during transfers and HR stabilized during rest breaks. Overall improvement in HR during ambulation with RW. Pt demonstrated safe ues of RW during transfers and gait and reported overall improved stability. Recommending dc home with HH occupational therapy evaluation, as pt with increased sx with LBD. Recommending OP PT for management of Hart sx. Pt has dc orders in; will sign off at this time. Please re-order if status changes.    Electronically signed by Maggi Keenan PT at 22 1305     Maggi Keenan PT at 22 102  Version 1 of 1         Patient Name: Deepa Garcia  : 1990    MRN: [id number]                              Today's Date: 2022       Admit Date: 2022    Visit Dx: No diagnosis found.  Patient Active Problem List   Diagnosis   • Dehydration   • Pott's disease     Past Medical History:   Diagnosis Date   • Arrhythmia     SVT   • Dehydration    • Hypothyroidism    • POTS (postural orthostatic tachycardia syndrome)      Past Surgical History:   Procedure Laterality Date   • ANKLE SURGERY     • CARDIAC ASSIST DEVICE INSERTION      Loop recorder   •  SECTION     • CHOLECYSTECTOMY     • FERTILITY SURGERY      IVF   • TONSILLECTOMY     • TUBAL ABDOMINAL LIGATION        General Information     Row Name 22 1234          Physical Therapy Time and Intention    Document Type evaluation  -HS     Mode of Treatment physical therapy  -HS     Row Name 22 1234          General Information     Prior Level of Function independent:;all household mobility;community mobility;gait  -HS     Existing Precautions/Restrictions --  Recent dx of SIMPSON  -     Barriers to Rehab medically complex  -     Row Name 05/07/22 1234          Living Environment    People in Home spouse;child(rebecca), dependent  -     Row Name 05/07/22 1234          Home Main Entrance    Number of Stairs, Main Entrance one  -HS     Row Name 05/07/22 1234          Stairs Within Home, Primary    Number of Stairs, Within Home, Primary other (see comments)  15 steps to basement  -     Stair Railings, Within Home, Primary railing on right side (ascending)  -     Row Name 05/07/22 1234          Cognition    Orientation Status (Cognition) oriented x 4  -HS           User Key  (r) = Recorded By, (t) = Taken By, (c) = Cosigned By    Initials Name Provider Type    Maggi Olsen PT Physical Therapist               Mobility     Row Name 05/07/22 1236          Bed Mobility    Bed Mobility bed mobility (all) activities  -     All Activities, Conrad (Bed Mobility) independent  -     Row Name 05/07/22 1236          Sit-Stand Transfer    Sit-Stand Conrad (Transfers) standby assist  -HS     Comment, (Sit-Stand Transfer) Educated on safe use of RW for when pt having a difficult day  -     Row Name 05/07/22 1236          Gait/Stairs (Locomotion)    Conrad Level (Gait) standby assist  -HS     Assistive Device (Gait) walker, front-wheeled  -     Distance in Feet (Gait) 100+  -HS     Deviations/Abnormal Patterns (Gait) jessica decreased;gait speed decreased  -     Comment, (Gait/Stairs) Pt with overall improved HR with use of RW. Recommending use of rollator to decrease exertion when pt has a flare up of sx or feels dizzy.  -           User Key  (r) = Recorded By, (t) = Taken By, (c) = Cosigned By    Initials Name Provider Type    Maggi Olsen PT Physical Therapist               Obj/Interventions     Row Name 05/07/22  1239          Range of Motion Comprehensive    General Range of Motion no range of motion deficits identified  -     Row Name 05/07/22 1239          Strength Comprehensive (MMT)    General Manual Muscle Testing (MMT) Assessment no strength deficits identified  -           User Key  (r) = Recorded By, (t) = Taken By, (c) = Cosigned By    Initials Name Provider Type    Maggi Olsen PT Physical Therapist               Goals/Plan    No documentation.                Clinical Impression     Row Name 05/07/22 1239          Pain    Pretreatment Pain Rating 0/10 - no pain  -HS     [facility] Name 05/07/22 1239          Plan of Care Review    Plan of Care Reviewed With spouse;patient  -     Outcome Evaluation Pt is a 31 yo F who presents to [facility] with dehydration. 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 Pt with exacerbation of Hart sx, and very high HH during transfers and ambulation. Educated patient on increasing time during transfers, and on wearing an abdominal binder to increase intra-abdominal pressure. Pt practiced taking increased time during transfers and HR stabilized during rest breaks. Overall improvement in HR during ambulation with RW. Pt demonstrated safe ues of RW during transfers and gait and reported overall improved stability. Recommending dc home with HH occupational therapy evaluation, as pt with increased sx with LBD. Recommending OP PT for management of Hart sx. Pt has dc orders in; will sign off at this time. Please re-order if status changes.  -     Row Name 05/07/22 1239          Therapy Assessment/Plan (PT)    Criteria for Skilled Interventions Met (PT) no  -HS     [facility] Name 05/07/22 1239          Positioning and Restraints    Post Treatment Position bed  -HS     In Bed notified nsg  -           User Key  (r) = Recorded By, (t) = Taken By, (c) = Cosigned By    Initials Name Provider Type    Maggi Olsen PT Physical Therapist                Outcome Measures    No documentation.                              Physical Therapy Education                 Title: PT OT SLP Therapies (Done)     Topic: Physical Therapy (Done)     Point: Mobility training (Done)     Learning Progress Summary           Patient Acceptance, E, VU by  at 5/7/2022 1304                   Point: Home exercise program (Done)     Learning Progress Summary           Patient Acceptance, E, VU by HS at 5/7/2022 1304                   Point: Body mechanics (Done)     Learning Progress Summary           Patient Acceptance, E, VU by HS at 5/7/2022 1304                   Point: Precautions (Done)     Learning Progress Summary           Patient Acceptance, E, VU by HS at 5/7/2022 1304                               User Key     Initials Effective Dates Name Provider Type Discipline     09/13/21 -  Maggi Keenan, TATI Physical Therapist PT              PT Recommendation and Plan     Plan of Care Reviewed With: spouse, patient  Outcome Evaluation: Pt is a 31 yo F who presents to [facility] with dehydration. 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 Pt with exacerbation of Hart sx, and very high HH during transfers and ambulation. Educated patient on increasing time during transfers, and on wearing an abdominal binder to increase intra-abdominal pressure. Pt practiced taking increased time during transfers and HR stabilized during rest breaks. Overall improvement in HR during ambulation with RW. Pt demonstrated safe ues of RW during transfers and gait and reported overall improved stability. Recommending dc home with HH occupational therapy evaluation, as pt with increased sx with LBD. Recommending OP PT for management of Hart sx. Pt has dc orders in; will sign off at this time. Please re-order if status changes.     Time Calculation:    PT Charges     Row Name 05/07/22 1309             Time Calculation    Start Time 1027  -      Stop Time 1107   "-HS      Time Calculation (min) 40 min  -HS      PT Received On 22  -HS            User Key  (r) = Recorded By, (t) = Taken By, (c) = Cosigned By    Initials Name Provider Type    HS Maggi Keenan PT Physical Therapist              Therapy Charges for Today     Code Description Service Date Service Provider Modifiers Qty    61456010622 HC PT EVAL MOD COMPLEXITY 4 2022 Maggi Keenan PT GP 1               Maggi Keenan PT  2022      Electronically signed by Maggi Keenan PT at 22 1306         [address] MEDICAL INPATIENT  [address] [place] [address] [place] [address]  Dept. Phone:  [phone]  Dept. Fax:  [phone] Date Ordered: May 7, 2022         Patient:  Deepa Garcia MRN:  [id number]   [address]  [place] [address] :  1990  SSN:    Phone: [phone] Sex:  F     Weight: 90.8 kg (200 lb 2.8 oz)         Ht Readings from Last 1 Encounters:   22 180.3 cm (71\")         Walker               (Order ID: [id number])    Diagnosis:  Pott's disease (A18.01 [ICD-10-CM] 015.00,730.88 [ICD-9-CM])   Quantity:  1     Equipment:  Walker Folding with Wheels  Length of Need (99 Months = Lifetime): 99 Months = Lifetime        Authorizing Provider's Phone: [phone]  Verbal Order Mode: Telephone with readback   Authorizing Provider: Nilsa Hardwick APRN  Authorizing Provider's NPI: [id number]     Order Entered By: Maura Alvarez RN 2022  3:07 PM          [address] MEDICAL INPATIENT  [address] [place] [address] [place] [address]  Dept. Phone:  [phone]  Dept. Fax:  [phone] Date Ordered: May 7, 2022         Patient:  Deepa Garcia MRN:  [id number]   [address]  [place] [address] :  1990  SSN:    Phone: [phone] Sex:  F     Weight: 90.8 kg (200 lb 2.8 oz)         Ht Readings from Last 1 Encounters:   22 180.3 cm (71\")         Commode Chair          (Order ID: [id number])    Diagnosis:  Pott's " disease (A18.01 [ICD-10-CM] 015.00,730.88 [ICD-9-CM])   Quantity:  1     Equipment:  Bedside Commode Chair w/Fixed Arms  Length of Need (99 Months = Lifetime): 99 Months = Lifetime        Authorizing Provider's Phone: [phone]  Verbal Order Mode: Telephone with readback   Authorizing Provider: Nilsa Hardwick APRN  Authorizing Provider's NPI: [id number]     Order Entered By: Maura Alvarez RN 5/7/2022  3:09 PM

## 2022-05-07 NOTE — PLAN OF CARE
Goal Outcome Evaluation:  Plan of Care Reviewed With: spouse, patient           Outcome Evaluation: Pt is a 31 yo F who presents to [facility] with dehydration. 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 Pt with exacerbation of Hart sx, and very high HH during transfers and ambulation. Educated patient on increasing time during transfers, and on wearing an abdominal binder to increase intra-abdominal pressure. Pt practiced taking increased time during transfers and HR stabilized during rest breaks. Overall improvement in HR during ambulation with RW. Pt demonstrated safe ues of RW during transfers and gait and reported overall improved stability. Recommending dc home with HH occupational therapy evaluation, as pt with increased sx with LBD. Recommending OP PT for management of Hart sx. Pt has dc orders in; will sign off at this time. Please re-order if status changes.

## 2022-05-07 NOTE — THERAPY EVALUATION
Patient Name: Deepa Garcia  : 1990    MRN: [id number]                              Today's Date: 2022       Admit Date: 2022    Visit Dx: No diagnosis found.  Patient Active Problem List   Diagnosis   • Dehydration   • Pott's disease     Past Medical History:   Diagnosis Date   • Arrhythmia     SVT   • Dehydration    • Hypothyroidism    • POTS (postural orthostatic tachycardia syndrome)      Past Surgical History:   Procedure Laterality Date   • ANKLE SURGERY     • CARDIAC ASSIST DEVICE INSERTION      Loop recorder   •  SECTION     • CHOLECYSTECTOMY     • FERTILITY SURGERY      IVF   • TONSILLECTOMY     • TUBAL ABDOMINAL LIGATION        General Information     Row Name 22 1234          Physical Therapy Time and Intention    Document Type evaluation  -HS     Mode of Treatment physical therapy  -HS     Row Name 22 1234          General Information    Prior Level of Function independent:;all household mobility;community mobility;gait  -HS     Existing Precautions/Restrictions --  Recent dx of SIMPSON  -     Barriers to Rehab medically complex  -HS     Row Name 22 1234          Living Environment    People in Home spouse;child(rebecca), dependent  -     Row Name 22 1234          Home Main Entrance    Number of Stairs, Main Entrance one  -HS     Row Name 22 1234          Stairs Within Home, Primary    Number of Stairs, Within Home, Primary other (see comments)  15 steps to basement  -     Stair Railings, Within Home, Primary railing on right side (ascending)  -HS     Row Name 22 1234          Cognition    Orientation Status (Cognition) oriented x 4  -HS           User Key  (r) = Recorded By, (t) = Taken By, (c) = Cosigned By    Initials Name Provider Type    HS Maggi Keenan PT Physical Therapist               Mobility     Row Name 22 1236          Bed Mobility    Bed Mobility bed mobility (all) activities  -HS     All Activities, Elsa (Bed  Mobility) independent  -[facility] Name 05/07/22 1236          Sit-Stand Transfer    Sit-Stand [place] (Transfers) standby assist  -     Comment, (Sit-Stand Transfer) Educated on safe use of RW for when pt having a difficult day  -[facility] Name 05/07/22 1236          Gait/Stairs (Locomotion)    [place] Level (Gait) standby assist  -     Assistive Device (Gait) walker, front-wheeled  -     Distance in Feet (Gait) 100+  -HS     Deviations/Abnormal Patterns (Gait) jessica decreased;gait speed decreased  -     Comment, (Gait/Stairs) Pt with overall improved HR with use of RW. Recommending use of rollator to decrease exertion when pt has a flare up of sx or feels dizzy.  -           User Key  (r) = Recorded By, (t) = Taken By, (c) = Cosigned By    Initials Name Provider Type     Maggi Keenan PT Physical Therapist               Obj/Interventions     Row Name 05/07/22 1239          Range of Motion Comprehensive    General Range of Motion no range of motion deficits identified  -HS     Row Name 05/07/22 [facility]9          Strength Comprehensive (MMT)    General Manual Muscle Testing (MMT) Assessment no strength deficits identified  -           User Key  (r) = Recorded By, (t) = Taken By, (c) = Cosigned By    Initials Name Provider Type     Maggi Keenan PT Physical Therapist               Goals/Plan    No documentation.                Clinical Impression     Row Name 05/07/22 [facility]9          Pain    Pretreatment Pain Rating 0/10 - no pain  -HS     Row Name 05/07/22 [facility]9          Plan of Care Review    Plan of Care Reviewed With spouse;patient  -     Outcome Evaluation Pt is a 31 yo F who presents to [facility] with dehydration. 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 Pt with exacerbation of Hart sx, and very high HH during transfers and ambulation. Educated patient on increasing time during transfers, and on wearing an abdominal binder to increase  intra-abdominal pressure. Pt practiced taking increased time during transfers and HR stabilized during rest breaks. Overall improvement in HR during ambulation with RW. Pt demonstrated safe ues of RW during transfers and gait and reported overall improved stability. Recommending dc home with HH occupational therapy evaluation, as pt with increased sx with LBD. Recommending OP PT for management of Hart sx. Pt has dc orders in; will sign off at this time. Please re-order if status changes.  -HS     Row Name 05/07/22 1239          Therapy Assessment/Plan (PT)    Criteria for Skilled Interventions Met (PT) no  -HS     Row Name 05/07/22 1239          Positioning and Restraints    Post Treatment Position bed  -     In Bed notified ns  -           User Key  (r) = Recorded By, (t) = Taken By, (c) = Cosigned By    Initials Name Provider Type     Maggi Keenan PT Physical Therapist               Outcome Measures    No documentation.                              Physical Therapy Education                 Title: PT OT SLP Therapies (Done)     Topic: Physical Therapy (Done)     Point: Mobility training (Done)     Learning Progress Summary           Patient Acceptance, E, VU by  at 5/7/2022 1304                   Point: Home exercise program (Done)     Learning Progress Summary           Patient Acceptance, E, VU by  at 5/7/2022 1304                   Point: Body mechanics (Done)     Learning Progress Summary           Patient Acceptance, E, VU by  at 5/7/2022 1304                   Point: Precautions (Done)     Learning Progress Summary           Patient Acceptance, E, VU by  at 5/7/2022 1304                               User Key     Initials Effective Dates Name Provider Type Discipline     09/13/21 -  Maggi Keenan PT Physical Therapist PT              PT Recommendation and Plan     Plan of Care Reviewed With: spouse, patient  Outcome Evaluation: Pt is a 31 yo F who presents to [facility] with dehydration. Recent  dx of Hart Syndrome, which has been managed with OP IVF 3x/week and a high salt diet. Pt with exacerbation of Hart sx, and very high HH during transfers and ambulation. Educated patient on increasing time during transfers, and on wearing an abdominal binder to increase intra-abdominal pressure. Pt practiced taking increased time during transfers and HR stabilized during rest breaks. Overall improvement in HR during ambulation with RW. Pt demonstrated safe ues of RW during transfers and gait and reported overall improved stability. Recommending dc home with HH occupational therapy evaluation, as pt with increased sx with LBD. Recommending OP PT for management of Hart sx. Pt has dc orders in; will sign off at this time. Please re-order if status changes.     Time Calculation:    PT Charges     Row Name 05/07/22 1305             Time Calculation    Start Time 1027  -HS      Stop Time 1107  -HS      Time Calculation (min) 40 min  -HS      PT Received On 05/07/22  -HS            User Key  (r) = Recorded By, (t) = Taken By, (c) = Cosigned By    Initials Name Provider Type     Maggi Keenan PT Physical Therapist              Therapy Charges for Today     Code Description Service Date Service Provider Modifiers Qty    46299448467 HC PT EVAL MOD COMPLEXITY 4 5/7/2022 Maggi Keenan PT GP 1               Maggi Keenan PT  5/7/2022

## 2022-05-07 NOTE — PROGRESS NOTES
"NEPHROLOGY PROGRESS NOTE------[facility]    [facility]  [phone]  Mallory Pryor MD      Patient Care Team:  Justine Watts MD as PCP - General (Family Medicine)  John Phan MD as Consulting Physician (Nephrology)      Provider:  Mallory Pryor MD  Patient Name: Deepa Garcia  :  1990    SUBJECTIVE:    F/U DEHYDRATION/ELECTROLYTE ABNORMALITIES    Feeling better overall this AM. Still some occasional palpitations. No angina. No dysuria.     Medication:  enoxaparin, 40 mg, Subcutaneous, Q24H  fludrocortisone, 0.05 mg, Oral, Q12H  levothyroxine, 75 mcg, Oral, Q AM  midodrine, 2.5 mg, Oral, TID AC  propranolol, 10 mg, Oral, TID  sodium chloride, 3 mL, Intravenous, Q12H  sodium chloride, 1 g, Oral, BID With Meals      Pharmacy to Dose enoxaparin (LOVENOX),   sodium chloride, 100 mL/hr, Last Rate: 100 mL/hr (22 0559)        OBJECTIVE    Vital Sign Min/Max for last 24 hours  Temp  Min: 97.8 °F (36.6 °C)  Max: 98.7 °F (37.1 °C)   BP  Min: 101/70  Max: 123/83   Pulse  Min: 79  Max: 94   Resp  Min: 12  Max: 18   SpO2  Min: 97 %  Max: 98 %   No data recorded   Weight  Min: 90.8 kg (200 lb 2.8 oz)  Max: 90.8 kg (200 lb 2.8 oz)     Flowsheet Rows    Flowsheet Row First Filed Value   Admission Height 180.3 cm (71\") Documented at 2022 1500   Admission Weight 90.8 kg (200 lb 2.8 oz) Documented at 2022 1500          No intake/output data recorded.  I/O last 3 completed shifts:  In: 480 [P.O.:480]  Out: -     Physical Exam:  General Appearance: alert, appears stated age and cooperative  Head: normocephalic, without obvious abnormality and atraumatic  Eyes: conjunctivae and sclerae normal and no icterus  Neck: supple and no JVD  Lungs: clear to auscultation and respirations regular  Heart: regular rhythm & normal rate and normal S1, S2 +POORNIMA  Chest: Wall no abnormalities observed  Abdomen: normal bowel sounds and soft " non-tender  Extremities: moves extremities well, no edema, no cyanosis and no redness  Skin: no bleeding, bruising or rash, turgor normal, color normal and no lesions noted  Neurologic: Alert, and oriented. No focal deficits    Labs:    WBC WBC   Date Value Ref Range Status   05/07/2022 4.00 3.40 - 10.80 10*3/mm3 Final   05/06/2022 3.90 3.40 - 10.80 10*3/mm3 Final      HGB Hemoglobin   Date Value Ref Range Status   05/07/2022 12.7 12.0 - 15.9 g/dL Final   05/06/2022 13.6 12.0 - 15.9 g/dL Final      HCT Hematocrit   Date Value Ref Range Status   05/07/2022 37.1 34.0 - 46.6 % Final   05/06/2022 41.1 34.0 - 46.6 % Final      Platlets No results found for: LABPLAT   MCV MCV   Date Value Ref Range Status   05/07/2022 84.9 79.0 - 97.0 fL Final   05/06/2022 86.7 79.0 - 97.0 fL Final          Sodium Sodium   Date Value Ref Range Status   05/07/2022 139 136 - 145 mmol/L Final   05/06/2022 138 136 - 145 mmol/L Final      Potassium Potassium   Date Value Ref Range Status   05/07/2022 4.3 3.5 - 5.2 mmol/L Final   05/06/2022 4.5 3.5 - 5.2 mmol/L Final     Comment:     Slight hemolysis detected by analyzer. Results may be affected.      Chloride Chloride   Date Value Ref Range Status   05/07/2022 105 98 - 107 mmol/L Final   05/06/2022 104 98 - 107 mmol/L Final      CO2 CO2   Date Value Ref Range Status   05/07/2022 27.0 22.0 - 29.0 mmol/L Final   05/06/2022 25.0 22.0 - 29.0 mmol/L Final      BUN BUN   Date Value Ref Range Status   05/07/2022 6 6 - 20 mg/dL Final   05/06/2022 8 6 - 20 mg/dL Final      Creatinine Creatinine   Date Value Ref Range Status   05/07/2022 0.63 0.57 - 1.00 mg/dL Final   05/06/2022 0.61 0.57 - 1.00 mg/dL Final      Calcium Calcium   Date Value Ref Range Status   05/07/2022 8.0 (L) 8.6 - 10.5 mg/dL Final   05/06/2022 8.1 (L) 8.6 - 10.5 mg/dL Final      PO4 No components found for: PO4   Albumin Albumin   Date Value Ref Range Status   05/06/2022 3.60 3.50 - 5.20 g/dL Final      Magnesium Magnesium   Date  Value Ref Range Status   05/07/2022 2.0 1.6 - 2.6 mg/dL Final   05/06/2022 1.9 1.6 - 2.6 mg/dL Final      Uric Acid No components found for: URIC ACID     Imaging Results (Last 72 Hours)     ** No results found for the last 72 hours. **                    ASSESSMENT / PLAN      Dehydration    1. DEHYDRATION/INTRAVASCULAR VOLUME DEPLETION-------Normal renal function at present. Agree with hydration with NS     2. POTS-----On Midodrine and IVFs. Bumped up Florinef and NaCL a little to both BID. Cortisol and thyroid function levels okay. Follow for need to increase Midodrine and consider addition of Pyridostigmine or Droxidopa or Ivabradine or Adderall. Counseled patient on use of LORA hose too     3. H/O SVT/TACHYCARDIA------On Propranolol     4. HYPOTHYROIDISM-------On Synthroid. TSH normal. Free T4 and Free T3 normal too     5. HYPOPHOSPHATEMIA-------Replaced     6. HYPOMAGNESEMIA-------Replace IV     7. HYPOCALCEMIA------Replaced     8. VITAMIN D DEFICIENCY------On Supplementation    Okay from RENAL standpoint to d/c and f/u as an outpatient as already scheduled    Mallory Pryor MD  [facility]  [phone]  05/07/22  07:29 EDT

## 2022-05-07 NOTE — PLAN OF CARE
Goal Outcome Evaluation:  Plan of Care Reviewed With: patient        Progress: improving   Patient did have a little nausea at beginning of shift.  Since then she has felt well enough to get up with stand by assist, she does go tachy when up moving around. Patient has had no other issues noted at this time.

## 2022-05-07 NOTE — CASE MANAGEMENT/SOCIAL WORK
Continued Stay Note  [facility]     Patient Name: Deepa Garcia  MRN: [id number]  Today's Date: 5/7/2022    Admit Date: 5/6/2022     Discharge Plan     Row Name 05/07/22 1619       Plan    Plan Plan to discharge home with spouse and new BSC and RW through [facility].    Patient/Family in Agreement with Plan yes    Plan Comments RN notified CM of patient needing RW and BSC at [facility]. Outpatient orders obtained. Referral placed to [facility]. 3-1 BSC and RW delivered to patient and spouse in room via [facility].              Met with patient in room wearing PPE: mask, face shield/goggles.    Maintained distance greater than six feet and spent less than 15 minutes in the room.    Maura Ontiveros RN  Weekend   [address] [address]  Office: [phone]  Fax: [phone]  [email or web address]

## 2022-05-09 ENCOUNTER — HOSPITAL ENCOUNTER (OUTPATIENT)
Dept: INFUSION THERAPY | Facility: HOSPITAL | Age: 32
Setting detail: INFUSION SERIES
End: 2022-05-09

## 2022-05-09 NOTE — CASE MANAGEMENT/SOCIAL WORK
Case Management Discharge Note      Final Note: Home         Selected Continued Care - Discharged on 5/7/2022 Admission date: 5/6/2022 - Discharge disposition: Home or Self Care        Durable Medical Equipment Coordination complete.    Service Provider Selected Services Address Phone Fax Patient Preferred    [facility] [employer] [facility]  Durable Medical Equipment [address] [address] [phone] --         Transportation Services  Private: Car    Final Discharge Disposition Code: 01 - home or self-care

## 2022-05-10 ENCOUNTER — HOSPITAL ENCOUNTER (OUTPATIENT)
Dept: INFUSION THERAPY | Facility: HOSPITAL | Age: 32
Setting detail: INFUSION SERIES
Discharge: HOME | End: 2022-05-10

## 2022-05-10 VITALS — RESPIRATION RATE: 16 BRPM | DIASTOLIC BLOOD PRESSURE: 76 MMHG | HEART RATE: 85 BPM | SYSTOLIC BLOOD PRESSURE: 122 MMHG

## 2022-05-10 DIAGNOSIS — A18.01 POTT'S DISEASE: ICD-10-CM

## 2022-05-10 DIAGNOSIS — E86.0 DEHYDRATION: Primary | ICD-10-CM

## 2022-05-10 PROCEDURE — 96360 HYDRATION IV INFUSION INIT: CPT

## 2022-05-10 RX ADMIN — SODIUM CHLORIDE, POTASSIUM CHLORIDE, SODIUM LACTATE AND CALCIUM CHLORIDE 1000 ML: 600; 310; 30; 20 INJECTION, SOLUTION INTRAVENOUS at 13:22

## 2022-05-11 ENCOUNTER — HOSPITAL ENCOUNTER (OUTPATIENT)
Dept: INFUSION THERAPY | Facility: HOSPITAL | Age: 32
Setting detail: INFUSION SERIES
End: 2022-05-11

## 2022-05-13 ENCOUNTER — HOSPITAL ENCOUNTER (OUTPATIENT)
Dept: INFUSION THERAPY | Facility: HOSPITAL | Age: 32
Setting detail: INFUSION SERIES
Discharge: HOME | End: 2022-05-13

## 2022-05-13 VITALS
HEART RATE: 73 BPM | DIASTOLIC BLOOD PRESSURE: 77 MMHG | RESPIRATION RATE: 17 BRPM | OXYGEN SATURATION: 98 % | SYSTOLIC BLOOD PRESSURE: 119 MMHG

## 2022-05-13 DIAGNOSIS — E86.0 DEHYDRATION: Primary | ICD-10-CM

## 2022-05-13 DIAGNOSIS — A18.01 POTT'S DISEASE: ICD-10-CM

## 2022-05-13 PROCEDURE — 96360 HYDRATION IV INFUSION INIT: CPT

## 2022-05-13 PROCEDURE — 36415 COLL VENOUS BLD VENIPUNCTURE: CPT

## 2022-05-13 RX ADMIN — SODIUM CHLORIDE, POTASSIUM CHLORIDE, SODIUM LACTATE AND CALCIUM CHLORIDE 1000 ML: 600; 310; 30; 20 INJECTION, SOLUTION INTRAVENOUS at 09:22

## 2022-05-16 ENCOUNTER — HOSPITAL ENCOUNTER (OUTPATIENT)
Dept: INFUSION THERAPY | Facility: HOSPITAL | Age: 32
Setting detail: INFUSION SERIES
Discharge: HOME | End: 2022-05-16

## 2022-05-16 VITALS
DIASTOLIC BLOOD PRESSURE: 76 MMHG | OXYGEN SATURATION: 98 % | RESPIRATION RATE: 18 BRPM | SYSTOLIC BLOOD PRESSURE: 110 MMHG | HEART RATE: 76 BPM

## 2022-05-16 DIAGNOSIS — A18.01 POTT'S DISEASE: ICD-10-CM

## 2022-05-16 DIAGNOSIS — E86.0 DEHYDRATION: Primary | ICD-10-CM

## 2022-05-16 PROCEDURE — 36415 COLL VENOUS BLD VENIPUNCTURE: CPT

## 2022-05-16 PROCEDURE — 96360 HYDRATION IV INFUSION INIT: CPT

## 2022-05-16 PROCEDURE — 96365 THER/PROPH/DIAG IV INF INIT: CPT

## 2022-05-16 RX ADMIN — SODIUM CHLORIDE, POTASSIUM CHLORIDE, SODIUM LACTATE AND CALCIUM CHLORIDE 1000 ML: 600; 310; 30; 20 INJECTION, SOLUTION INTRAVENOUS at 11:37

## 2022-05-18 ENCOUNTER — HOSPITAL ENCOUNTER (OUTPATIENT)
Dept: INFUSION THERAPY | Facility: HOSPITAL | Age: 32
Setting detail: INFUSION SERIES
Discharge: HOME | End: 2022-05-18

## 2022-05-18 VITALS
HEART RATE: 73 BPM | SYSTOLIC BLOOD PRESSURE: 135 MMHG | RESPIRATION RATE: 16 BRPM | DIASTOLIC BLOOD PRESSURE: 92 MMHG | OXYGEN SATURATION: 100 %

## 2022-05-18 DIAGNOSIS — A18.01 POTT'S DISEASE: ICD-10-CM

## 2022-05-18 DIAGNOSIS — E86.0 DEHYDRATION: Primary | ICD-10-CM

## 2022-05-18 LAB
ANION GAP SERPL CALCULATED.3IONS-SCNC: 10 MMOL/L (ref 5–15)
BUN SERPL-MCNC: 10 MG/DL (ref 6–20)
BUN/CREAT SERPL: 13.7 (ref 7–25)
CALCIUM SPEC-SCNC: 8.7 MG/DL (ref 8.6–10.5)
CHLORIDE SERPL-SCNC: 104 MMOL/L (ref 98–107)
CO2 SERPL-SCNC: 24 MMOL/L (ref 22–29)
CREAT SERPL-MCNC: 0.73 MG/DL (ref 0.57–1)
EGFRCR SERPLBLD CKD-EPI 2021: 111.5 ML/MIN/1.73
GLUCOSE SERPL-MCNC: 82 MG/DL (ref 65–99)
POTASSIUM SERPL-SCNC: 4 MMOL/L (ref 3.5–5.2)
SODIUM SERPL-SCNC: 138 MMOL/L (ref 136–145)

## 2022-05-18 PROCEDURE — 96360 HYDRATION IV INFUSION INIT: CPT

## 2022-05-18 PROCEDURE — 80048 BASIC METABOLIC PNL TOTAL CA: CPT | Performed by: INTERNAL MEDICINE

## 2022-05-18 PROCEDURE — 36415 COLL VENOUS BLD VENIPUNCTURE: CPT

## 2022-05-18 RX ADMIN — SODIUM CHLORIDE, POTASSIUM CHLORIDE, SODIUM LACTATE AND CALCIUM CHLORIDE 1000 ML: 600; 310; 30; 20 INJECTION, SOLUTION INTRAVENOUS at 11:51

## 2022-05-20 ENCOUNTER — HOSPITAL ENCOUNTER (OUTPATIENT)
Dept: INFUSION THERAPY | Facility: HOSPITAL | Age: 32
Setting detail: INFUSION SERIES
Discharge: HOME | End: 2022-05-20

## 2022-05-20 VITALS
DIASTOLIC BLOOD PRESSURE: 78 MMHG | HEART RATE: 77 BPM | SYSTOLIC BLOOD PRESSURE: 115 MMHG | RESPIRATION RATE: 17 BRPM | OXYGEN SATURATION: 98 %

## 2022-05-20 DIAGNOSIS — A18.01 POTT'S DISEASE: ICD-10-CM

## 2022-05-20 DIAGNOSIS — E86.0 DEHYDRATION: Primary | ICD-10-CM

## 2022-05-20 PROCEDURE — 96360 HYDRATION IV INFUSION INIT: CPT

## 2022-05-20 PROCEDURE — 96365 THER/PROPH/DIAG IV INF INIT: CPT

## 2022-05-20 PROCEDURE — 36415 COLL VENOUS BLD VENIPUNCTURE: CPT

## 2022-05-20 RX ADMIN — SODIUM CHLORIDE, POTASSIUM CHLORIDE, SODIUM LACTATE AND CALCIUM CHLORIDE 1000 ML: 600; 310; 30; 20 INJECTION, SOLUTION INTRAVENOUS at 10:52

## 2022-05-25 ENCOUNTER — APPOINTMENT (OUTPATIENT)
Dept: INFUSION THERAPY | Facility: HOSPITAL | Age: 32
End: 2022-05-25

## 2022-05-27 ENCOUNTER — APPOINTMENT (OUTPATIENT)
Dept: INFUSION THERAPY | Facility: HOSPITAL | Age: 32
End: 2022-05-27

## 2022-06-20 ENCOUNTER — TRANSCRIBE ORDERS (OUTPATIENT)
Dept: ADMINISTRATIVE | Facility: HOSPITAL | Age: 32
End: 2022-06-20

## 2022-06-20 ENCOUNTER — LAB (OUTPATIENT)
Dept: LAB | Facility: HOSPITAL | Age: 32
End: 2022-06-20

## 2022-06-20 DIAGNOSIS — A18.01 POTT'S DISEASE: ICD-10-CM

## 2022-06-20 DIAGNOSIS — A18.01 POTT'S DISEASE: Primary | ICD-10-CM

## 2022-06-20 LAB
ANION GAP SERPL CALCULATED.3IONS-SCNC: 12.3 MMOL/L (ref 5–15)
BUN SERPL-MCNC: 15 MG/DL (ref 6–20)
BUN/CREAT SERPL: 18.8 (ref 7–25)
CALCIUM SPEC-SCNC: 9.1 MG/DL (ref 8.6–10.5)
CHLORIDE SERPL-SCNC: 102 MMOL/L (ref 98–107)
CO2 SERPL-SCNC: 26.7 MMOL/L (ref 22–29)
CREAT SERPL-MCNC: 0.8 MG/DL (ref 0.57–1)
EGFRCR SERPLBLD CKD-EPI 2021: 99.9 ML/MIN/1.73
GLUCOSE SERPL-MCNC: 64 MG/DL (ref 65–99)
POTASSIUM SERPL-SCNC: 4.5 MMOL/L (ref 3.5–5.2)
SODIUM SERPL-SCNC: 141 MMOL/L (ref 136–145)

## 2022-06-20 PROCEDURE — 80048 BASIC METABOLIC PNL TOTAL CA: CPT

## 2022-06-20 PROCEDURE — 36415 COLL VENOUS BLD VENIPUNCTURE: CPT

## 2022-06-21 ENCOUNTER — LAB (OUTPATIENT)
Dept: LAB | Facility: HOSPITAL | Age: 32
End: 2022-06-21

## 2022-06-21 ENCOUNTER — TRANSCRIBE ORDERS (OUTPATIENT)
Dept: ADMINISTRATIVE | Facility: HOSPITAL | Age: 32
End: 2022-06-21

## 2022-06-21 DIAGNOSIS — A18.01 POTT'S DISEASE: ICD-10-CM

## 2022-06-21 DIAGNOSIS — A18.01 POTT'S DISEASE: Primary | ICD-10-CM

## 2022-06-21 LAB
ALBUMIN SERPL-MCNC: 4.3 G/DL (ref 3.5–5.2)
ALBUMIN/GLOB SERPL: 1.7 G/DL
ALP SERPL-CCNC: 63 U/L (ref 39–117)
ALT SERPL W P-5'-P-CCNC: 10 U/L (ref 1–33)
ANION GAP SERPL CALCULATED.3IONS-SCNC: 13.6 MMOL/L (ref 5–15)
AST SERPL-CCNC: 13 U/L (ref 1–32)
BASOPHILS # BLD AUTO: 0.03 10*3/MM3 (ref 0–0.2)
BASOPHILS NFR BLD AUTO: 0.2 % (ref 0–1.5)
BILIRUB SERPL-MCNC: 0.7 MG/DL (ref 0–1.2)
BUN SERPL-MCNC: 11 MG/DL (ref 6–20)
BUN/CREAT SERPL: 15.3 (ref 7–25)
CALCIUM SPEC-SCNC: 9.5 MG/DL (ref 8.6–10.5)
CHLORIDE SERPL-SCNC: 99 MMOL/L (ref 98–107)
CO2 SERPL-SCNC: 25.4 MMOL/L (ref 22–29)
CREAT SERPL-MCNC: 0.72 MG/DL (ref 0.57–1)
DEPRECATED RDW RBC AUTO: 37.1 FL (ref 37–54)
EGFRCR SERPLBLD CKD-EPI 2021: 113.4 ML/MIN/1.73
EOSINOPHIL # BLD AUTO: 0.12 10*3/MM3 (ref 0–0.4)
EOSINOPHIL NFR BLD AUTO: 0.9 % (ref 0.3–6.2)
ERYTHROCYTE [DISTWIDTH] IN BLOOD BY AUTOMATED COUNT: 12 % (ref 12.3–15.4)
GLOBULIN UR ELPH-MCNC: 2.5 GM/DL
GLUCOSE SERPL-MCNC: 73 MG/DL (ref 65–99)
HCT VFR BLD AUTO: 39.2 % (ref 34–46.6)
HGB BLD-MCNC: 13.3 G/DL (ref 12–15.9)
IMM GRANULOCYTES # BLD AUTO: 0.05 10*3/MM3 (ref 0–0.05)
IMM GRANULOCYTES NFR BLD AUTO: 0.4 % (ref 0–0.5)
LYMPHOCYTES # BLD AUTO: 1.47 10*3/MM3 (ref 0.7–3.1)
LYMPHOCYTES NFR BLD AUTO: 11.2 % (ref 19.6–45.3)
MAGNESIUM SERPL-MCNC: 1.9 MG/DL (ref 1.6–2.6)
MCH RBC QN AUTO: 29.1 PG (ref 26.6–33)
MCHC RBC AUTO-ENTMCNC: 33.9 G/DL (ref 31.5–35.7)
MCV RBC AUTO: 85.8 FL (ref 79–97)
MONOCYTES # BLD AUTO: 0.84 10*3/MM3 (ref 0.1–0.9)
MONOCYTES NFR BLD AUTO: 6.4 % (ref 5–12)
NEUTROPHILS NFR BLD AUTO: 10.65 10*3/MM3 (ref 1.7–7)
NEUTROPHILS NFR BLD AUTO: 80.9 % (ref 42.7–76)
NRBC BLD AUTO-RTO: 0 /100 WBC (ref 0–0.2)
PHOSPHATE SERPL-MCNC: 3.7 MG/DL (ref 2.5–4.5)
PLATELET # BLD AUTO: 256 10*3/MM3 (ref 140–450)
PMV BLD AUTO: 9.3 FL (ref 6–12)
POTASSIUM SERPL-SCNC: 4.4 MMOL/L (ref 3.5–5.2)
PROT SERPL-MCNC: 6.8 G/DL (ref 6–8.5)
RBC # BLD AUTO: 4.57 10*6/MM3 (ref 3.77–5.28)
SODIUM SERPL-SCNC: 138 MMOL/L (ref 136–145)
URATE SERPL-MCNC: 3.7 MG/DL (ref 2.4–5.7)
WBC NRBC COR # BLD: 13.16 10*3/MM3 (ref 3.4–10.8)

## 2022-06-21 PROCEDURE — 85025 COMPLETE CBC W/AUTO DIFF WBC: CPT

## 2022-06-21 PROCEDURE — 80053 COMPREHEN METABOLIC PANEL: CPT

## 2022-06-21 PROCEDURE — 84550 ASSAY OF BLOOD/URIC ACID: CPT

## 2022-06-21 PROCEDURE — 83735 ASSAY OF MAGNESIUM: CPT

## 2022-06-21 PROCEDURE — 84100 ASSAY OF PHOSPHORUS: CPT

## 2022-08-02 ENCOUNTER — APPOINTMENT (OUTPATIENT)
Dept: INFUSION THERAPY | Facility: HOSPITAL | Age: 32
End: 2022-08-02

## 2023-01-04 DIAGNOSIS — J18.9 RECURRENT PNEUMONIA: Primary | ICD-10-CM

## 2023-01-20 ENCOUNTER — APPOINTMENT (OUTPATIENT)
Dept: CT IMAGING | Facility: HOSPITAL | Age: 33
End: 2023-01-20
Payer: COMMERCIAL

## 2023-01-20 ENCOUNTER — APPOINTMENT (OUTPATIENT)
Dept: GENERAL RADIOLOGY | Facility: HOSPITAL | Age: 33
End: 2023-01-20
Payer: COMMERCIAL

## 2023-01-20 ENCOUNTER — HOSPITAL ENCOUNTER (OUTPATIENT)
Facility: HOSPITAL | Age: 33
Discharge: HOME | End: 2023-01-22
Attending: EMERGENCY MEDICINE | Admitting: INTERNAL MEDICINE
Payer: COMMERCIAL

## 2023-01-20 DIAGNOSIS — R04.2 HEMOPTYSIS: ICD-10-CM

## 2023-01-20 DIAGNOSIS — J18.9 PNEUMONIA DUE TO INFECTIOUS ORGANISM, UNSPECIFIED LATERALITY, UNSPECIFIED PART OF LUNG: Primary | ICD-10-CM

## 2023-01-20 LAB
ALBUMIN SERPL-MCNC: 4.4 G/DL (ref 3.5–5.2)
ALBUMIN/GLOB SERPL: 1.6 G/DL
ALP SERPL-CCNC: 62 U/L (ref 39–117)
ALT SERPL W P-5'-P-CCNC: 11 U/L (ref 1–33)
ANION GAP SERPL CALCULATED.3IONS-SCNC: 12 MMOL/L (ref 5–15)
AST SERPL-CCNC: 15 U/L (ref 1–32)
B PARAPERT DNA SPEC QL NAA+PROBE: NOT DETECTED
B PERT DNA SPEC QL NAA+PROBE: NOT DETECTED
BASOPHILS # BLD AUTO: 0 10*3/MM3 (ref 0–0.2)
BASOPHILS # BLD AUTO: 0 10*3/MM3 (ref 0–0.2)
BASOPHILS NFR BLD AUTO: 0.5 % (ref 0–1.5)
BASOPHILS NFR BLD AUTO: 0.9 % (ref 0–1.5)
BILIRUB SERPL-MCNC: 0.7 MG/DL (ref 0–1.2)
BUN SERPL-MCNC: 19 MG/DL (ref 6–20)
BUN/CREAT SERPL: 20.9 (ref 7–25)
C PNEUM DNA NPH QL NAA+NON-PROBE: NOT DETECTED
CALCIUM SPEC-SCNC: 9.6 MG/DL (ref 8.6–10.5)
CHLORIDE SERPL-SCNC: 100 MMOL/L (ref 98–107)
CO2 SERPL-SCNC: 23 MMOL/L (ref 22–29)
CREAT SERPL-MCNC: 0.91 MG/DL (ref 0.57–1)
D-LACTATE SERPL-SCNC: 0.6 MMOL/L (ref 0.5–2)
DEPRECATED RDW RBC AUTO: 41.1 FL (ref 37–54)
DEPRECATED RDW RBC AUTO: 41.6 FL (ref 37–54)
EGFRCR SERPLBLD CKD-EPI 2021: 85.6 ML/MIN/1.73
EOSINOPHIL # BLD AUTO: 0.1 10*3/MM3 (ref 0–0.4)
EOSINOPHIL # BLD AUTO: 0.1 10*3/MM3 (ref 0–0.4)
EOSINOPHIL NFR BLD AUTO: 1.3 % (ref 0.3–6.2)
EOSINOPHIL NFR BLD AUTO: 2.9 % (ref 0.3–6.2)
ERYTHROCYTE [DISTWIDTH] IN BLOOD BY AUTOMATED COUNT: 13.3 % (ref 12.3–15.4)
ERYTHROCYTE [DISTWIDTH] IN BLOOD BY AUTOMATED COUNT: 13.4 % (ref 12.3–15.4)
FLUAV SUBTYP SPEC NAA+PROBE: NOT DETECTED
FLUBV RNA ISLT QL NAA+PROBE: NOT DETECTED
GLOBULIN UR ELPH-MCNC: 2.8 GM/DL
GLUCOSE SERPL-MCNC: 90 MG/DL (ref 65–99)
HADV DNA SPEC NAA+PROBE: NOT DETECTED
HCOV 229E RNA SPEC QL NAA+PROBE: NOT DETECTED
HCOV HKU1 RNA SPEC QL NAA+PROBE: NOT DETECTED
HCOV NL63 RNA SPEC QL NAA+PROBE: NOT DETECTED
HCOV OC43 RNA SPEC QL NAA+PROBE: NOT DETECTED
HCT VFR BLD AUTO: 34.1 % (ref 34–46.6)
HCT VFR BLD AUTO: 37.1 % (ref 34–46.6)
HGB BLD-MCNC: 12.1 G/DL (ref 12–15.9)
HGB BLD-MCNC: 13.3 G/DL (ref 12–15.9)
HMPV RNA NPH QL NAA+NON-PROBE: NOT DETECTED
HPIV1 RNA ISLT QL NAA+PROBE: NOT DETECTED
HPIV2 RNA SPEC QL NAA+PROBE: NOT DETECTED
HPIV3 RNA NPH QL NAA+PROBE: NOT DETECTED
HPIV4 P GENE NPH QL NAA+PROBE: NOT DETECTED
LYMPHOCYTES # BLD AUTO: 1.1 10*3/MM3 (ref 0.7–3.1)
LYMPHOCYTES # BLD AUTO: 1.2 10*3/MM3 (ref 0.7–3.1)
LYMPHOCYTES NFR BLD AUTO: 23.4 % (ref 19.6–45.3)
LYMPHOCYTES NFR BLD AUTO: 23.8 % (ref 19.6–45.3)
M PNEUMO IGG SER IA-ACNC: NOT DETECTED
MCH RBC QN AUTO: 29.6 PG (ref 26.6–33)
MCH RBC QN AUTO: 29.9 PG (ref 26.6–33)
MCHC RBC AUTO-ENTMCNC: 35.5 G/DL (ref 31.5–35.7)
MCHC RBC AUTO-ENTMCNC: 35.9 G/DL (ref 31.5–35.7)
MCV RBC AUTO: 83.3 FL (ref 79–97)
MCV RBC AUTO: 83.4 FL (ref 79–97)
MONOCYTES # BLD AUTO: 0.6 10*3/MM3 (ref 0.1–0.9)
MONOCYTES # BLD AUTO: 0.9 10*3/MM3 (ref 0.1–0.9)
MONOCYTES NFR BLD AUTO: 13 % (ref 5–12)
MONOCYTES NFR BLD AUTO: 17.5 % (ref 5–12)
NEUTROPHILS NFR BLD AUTO: 2.8 10*3/MM3 (ref 1.7–7)
NEUTROPHILS NFR BLD AUTO: 2.9 10*3/MM3 (ref 1.7–7)
NEUTROPHILS NFR BLD AUTO: 57.3 % (ref 42.7–76)
NEUTROPHILS NFR BLD AUTO: 59.4 % (ref 42.7–76)
NRBC BLD AUTO-RTO: 0 /100 WBC (ref 0–0.2)
NRBC BLD AUTO-RTO: 0.1 /100 WBC (ref 0–0.2)
NT-PROBNP SERPL-MCNC: 44.6 PG/ML (ref 0–450)
PLATELET # BLD AUTO: 229 10*3/MM3 (ref 140–450)
PLATELET # BLD AUTO: 245 10*3/MM3 (ref 140–450)
PMV BLD AUTO: 7.1 FL (ref 6–12)
PMV BLD AUTO: 7.4 FL (ref 6–12)
POTASSIUM SERPL-SCNC: 4.1 MMOL/L (ref 3.5–5.2)
PROT SERPL-MCNC: 7.2 G/DL (ref 6–8.5)
RBC # BLD AUTO: 4.09 10*6/MM3 (ref 3.77–5.28)
RBC # BLD AUTO: 4.45 10*6/MM3 (ref 3.77–5.28)
RHINOVIRUS RNA SPEC NAA+PROBE: DETECTED
RSV RNA NPH QL NAA+NON-PROBE: NOT DETECTED
SARS-COV-2 RNA NPH QL NAA+NON-PROBE: NOT DETECTED
SODIUM SERPL-SCNC: 135 MMOL/L (ref 136–145)
TROPONIN T SERPL-MCNC: <0.01 NG/ML (ref 0–0.03)
TSH SERPL DL<=0.05 MIU/L-ACNC: 1.65 UIU/ML (ref 0.27–4.2)
WBC NRBC COR # BLD: 4.7 10*3/MM3 (ref 3.4–10.8)
WBC NRBC COR # BLD: 5 10*3/MM3 (ref 3.4–10.8)

## 2023-01-20 PROCEDURE — G0378 HOSPITAL OBSERVATION PER HR: HCPCS

## 2023-01-20 PROCEDURE — 71275 CT ANGIOGRAPHY CHEST: CPT

## 2023-01-20 PROCEDURE — 83735 ASSAY OF MAGNESIUM: CPT | Performed by: FAMILY MEDICINE

## 2023-01-20 PROCEDURE — 83880 ASSAY OF NATRIURETIC PEPTIDE: CPT | Performed by: NURSE PRACTITIONER

## 2023-01-20 PROCEDURE — 84443 ASSAY THYROID STIM HORMONE: CPT | Performed by: NURSE PRACTITIONER

## 2023-01-20 PROCEDURE — 93005 ELECTROCARDIOGRAM TRACING: CPT | Performed by: NURSE PRACTITIONER

## 2023-01-20 PROCEDURE — 0 IOPAMIDOL PER 1 ML: Performed by: EMERGENCY MEDICINE

## 2023-01-20 PROCEDURE — 83605 ASSAY OF LACTIC ACID: CPT

## 2023-01-20 PROCEDURE — 0202U NFCT DS 22 TRGT SARS-COV-2: CPT | Performed by: EMERGENCY MEDICINE

## 2023-01-20 PROCEDURE — 96365 THER/PROPH/DIAG IV INF INIT: CPT

## 2023-01-20 PROCEDURE — 80053 COMPREHEN METABOLIC PANEL: CPT | Performed by: NURSE PRACTITIONER

## 2023-01-20 PROCEDURE — 85025 COMPLETE CBC W/AUTO DIFF WBC: CPT | Performed by: NURSE PRACTITIONER

## 2023-01-20 PROCEDURE — 99285 EMERGENCY DEPT VISIT HI MDM: CPT

## 2023-01-20 PROCEDURE — 71045 X-RAY EXAM CHEST 1 VIEW: CPT

## 2023-01-20 PROCEDURE — 87040 BLOOD CULTURE FOR BACTERIA: CPT | Performed by: EMERGENCY MEDICINE

## 2023-01-20 PROCEDURE — 84484 ASSAY OF TROPONIN QUANT: CPT | Performed by: NURSE PRACTITIONER

## 2023-01-20 PROCEDURE — 25010000002 CEFTRIAXONE PER 250 MG: Performed by: EMERGENCY MEDICINE

## 2023-01-20 PROCEDURE — 85025 COMPLETE CBC W/AUTO DIFF WBC: CPT | Performed by: FAMILY MEDICINE

## 2023-01-20 RX ORDER — SODIUM CHLORIDE 0.9 % (FLUSH) 0.9 %
3 SYRINGE (ML) INJECTION EVERY 12 HOURS SCHEDULED
Status: DISCONTINUED | OUTPATIENT
Start: 2023-01-20 | End: 2023-01-22 | Stop reason: HOSPADM

## 2023-01-20 RX ORDER — ACETAMINOPHEN 325 MG/1
650 TABLET ORAL EVERY 4 HOURS PRN
Status: DISCONTINUED | OUTPATIENT
Start: 2023-01-20 | End: 2023-01-22 | Stop reason: HOSPADM

## 2023-01-20 RX ORDER — MAGNESIUM SULFATE HEPTAHYDRATE 40 MG/ML
2 INJECTION, SOLUTION INTRAVENOUS AS NEEDED
Status: DISCONTINUED | OUTPATIENT
Start: 2023-01-20 | End: 2023-01-22 | Stop reason: HOSPADM

## 2023-01-20 RX ORDER — ONDANSETRON 4 MG/1
4 TABLET, FILM COATED ORAL EVERY 6 HOURS PRN
Status: DISCONTINUED | OUTPATIENT
Start: 2023-01-20 | End: 2023-01-22 | Stop reason: HOSPADM

## 2023-01-20 RX ORDER — LEVOTHYROXINE SODIUM 0.07 MG/1
75 TABLET ORAL
Status: DISCONTINUED | OUTPATIENT
Start: 2023-01-21 | End: 2023-01-22 | Stop reason: HOSPADM

## 2023-01-20 RX ORDER — POTASSIUM CHLORIDE 20 MEQ/1
40 TABLET, EXTENDED RELEASE ORAL AS NEEDED
Status: DISCONTINUED | OUTPATIENT
Start: 2023-01-20 | End: 2023-01-22 | Stop reason: HOSPADM

## 2023-01-20 RX ORDER — SODIUM CHLORIDE 9 MG/ML
40 INJECTION, SOLUTION INTRAVENOUS AS NEEDED
Status: DISCONTINUED | OUTPATIENT
Start: 2023-01-20 | End: 2023-01-22 | Stop reason: HOSPADM

## 2023-01-20 RX ORDER — POTASSIUM CHLORIDE 7.45 MG/ML
10 INJECTION INTRAVENOUS
Status: DISCONTINUED | OUTPATIENT
Start: 2023-01-20 | End: 2023-01-22 | Stop reason: HOSPADM

## 2023-01-20 RX ORDER — ACETAMINOPHEN 650 MG/1
650 SUPPOSITORY RECTAL EVERY 4 HOURS PRN
Status: DISCONTINUED | OUTPATIENT
Start: 2023-01-20 | End: 2023-01-22 | Stop reason: HOSPADM

## 2023-01-20 RX ORDER — SODIUM CHLORIDE 0.9 % (FLUSH) 0.9 %
3-10 SYRINGE (ML) INJECTION AS NEEDED
Status: DISCONTINUED | OUTPATIENT
Start: 2023-01-20 | End: 2023-01-22 | Stop reason: HOSPADM

## 2023-01-20 RX ORDER — MAGNESIUM SULFATE HEPTAHYDRATE 40 MG/ML
4 INJECTION, SOLUTION INTRAVENOUS AS NEEDED
Status: DISCONTINUED | OUTPATIENT
Start: 2023-01-20 | End: 2023-01-22 | Stop reason: HOSPADM

## 2023-01-20 RX ORDER — SODIUM CHLORIDE 1000 MG
1 TABLET, SOLUBLE MISCELLANEOUS DAILY
Status: DISCONTINUED | OUTPATIENT
Start: 2023-01-21 | End: 2023-01-22 | Stop reason: HOSPADM

## 2023-01-20 RX ORDER — POTASSIUM CHLORIDE 1.5 G/1.77G
40 POWDER, FOR SOLUTION ORAL AS NEEDED
Status: DISCONTINUED | OUTPATIENT
Start: 2023-01-20 | End: 2023-01-22 | Stop reason: HOSPADM

## 2023-01-20 RX ORDER — ACETAMINOPHEN 500 MG
1000 TABLET ORAL ONCE
Status: COMPLETED | OUTPATIENT
Start: 2023-01-20 | End: 2023-01-20

## 2023-01-20 RX ORDER — SODIUM CHLORIDE 9 MG/ML
100 INJECTION, SOLUTION INTRAVENOUS CONTINUOUS
Status: DISCONTINUED | OUTPATIENT
Start: 2023-01-20 | End: 2023-01-22 | Stop reason: HOSPADM

## 2023-01-20 RX ORDER — MIDODRINE HYDROCHLORIDE 2.5 MG/1
2.5 TABLET ORAL
Status: DISCONTINUED | OUTPATIENT
Start: 2023-01-21 | End: 2023-01-22 | Stop reason: HOSPADM

## 2023-01-20 RX ORDER — FLUDROCORTISONE ACETATE 0.1 MG/1
0.05 TABLET ORAL DAILY
Status: DISCONTINUED | OUTPATIENT
Start: 2023-01-21 | End: 2023-01-22 | Stop reason: HOSPADM

## 2023-01-20 RX ORDER — ACETAMINOPHEN 160 MG/5ML
650 SOLUTION ORAL EVERY 4 HOURS PRN
Status: DISCONTINUED | OUTPATIENT
Start: 2023-01-20 | End: 2023-01-22 | Stop reason: HOSPADM

## 2023-01-20 RX ORDER — SODIUM CHLORIDE 0.9 % (FLUSH) 0.9 %
10 SYRINGE (ML) INJECTION AS NEEDED
Status: DISCONTINUED | OUTPATIENT
Start: 2023-01-20 | End: 2023-01-22 | Stop reason: HOSPADM

## 2023-01-20 RX ADMIN — IOPAMIDOL 100 ML: 755 INJECTION, SOLUTION INTRAVENOUS at 20:18

## 2023-01-20 RX ADMIN — SODIUM CHLORIDE 500 ML: 9 INJECTION, SOLUTION INTRAVENOUS at 20:54

## 2023-01-20 RX ADMIN — CEFTRIAXONE 2 G: 2 INJECTION, POWDER, FOR SOLUTION INTRAMUSCULAR; INTRAVENOUS at 22:29

## 2023-01-20 RX ADMIN — ACETAMINOPHEN 1000 MG: 500 TABLET, FILM COATED ORAL at 23:04

## 2023-01-20 RX ADMIN — SODIUM CHLORIDE 1000 ML: 9 INJECTION, SOLUTION INTRAVENOUS at 22:29

## 2023-01-20 RX ADMIN — SODIUM CHLORIDE 100 ML/HR: 9 INJECTION, SOLUTION INTRAVENOUS at 23:04

## 2023-01-20 RX ADMIN — Medication 3 ML: at 22:29

## 2023-01-20 RX ADMIN — DOXYCYCLINE 100 MG: 100 INJECTION, POWDER, LYOPHILIZED, FOR SOLUTION INTRAVENOUS at 22:29

## 2023-01-20 RX ADMIN — IOPAMIDOL 100 ML: 755 INJECTION, SOLUTION INTRAVENOUS at 20:26

## 2023-01-21 PROBLEM — R04.2 HEMOPTYSIS: Status: ACTIVE | Noted: 2023-01-20

## 2023-01-21 LAB
ANION GAP SERPL CALCULATED.3IONS-SCNC: 10 MMOL/L (ref 5–15)
BUN SERPL-MCNC: 15 MG/DL (ref 6–20)
BUN/CREAT SERPL: 18.5 (ref 7–25)
CALCIUM SPEC-SCNC: 8.6 MG/DL (ref 8.6–10.5)
CHLORIDE SERPL-SCNC: 102 MMOL/L (ref 98–107)
CO2 SERPL-SCNC: 23 MMOL/L (ref 22–29)
CREAT SERPL-MCNC: 0.81 MG/DL (ref 0.57–1)
EGFRCR SERPLBLD CKD-EPI 2021: 98.4 ML/MIN/1.73
GLUCOSE SERPL-MCNC: 111 MG/DL (ref 65–99)
MAGNESIUM SERPL-MCNC: 1.7 MG/DL (ref 1.6–2.6)
POTASSIUM SERPL-SCNC: 3.5 MMOL/L (ref 3.5–5.2)
QT INTERVAL: 287 MS
SODIUM SERPL-SCNC: 135 MMOL/L (ref 136–145)

## 2023-01-21 PROCEDURE — 96361 HYDRATE IV INFUSION ADD-ON: CPT

## 2023-01-21 PROCEDURE — 96375 TX/PRO/DX INJ NEW DRUG ADDON: CPT

## 2023-01-21 PROCEDURE — G0378 HOSPITAL OBSERVATION PER HR: HCPCS

## 2023-01-21 PROCEDURE — 25010000002 CEFTRIAXONE PER 250 MG: Performed by: FAMILY MEDICINE

## 2023-01-21 RX ORDER — PROPRANOLOL HYDROCHLORIDE 20 MG/1
20 TABLET ORAL 3 TIMES DAILY
Status: DISCONTINUED | OUTPATIENT
Start: 2023-01-21 | End: 2023-01-22 | Stop reason: HOSPADM

## 2023-01-21 RX ORDER — PANTOPRAZOLE SODIUM 40 MG/10ML
40 INJECTION, POWDER, LYOPHILIZED, FOR SOLUTION INTRAVENOUS
Status: DISCONTINUED | OUTPATIENT
Start: 2023-01-21 | End: 2023-01-22 | Stop reason: HOSPADM

## 2023-01-21 RX ADMIN — DOXYCYCLINE 100 MG: 100 INJECTION, POWDER, LYOPHILIZED, FOR SOLUTION INTRAVENOUS at 10:17

## 2023-01-21 RX ADMIN — CEFTRIAXONE SODIUM 2 G: 2 INJECTION, POWDER, FOR SOLUTION INTRAMUSCULAR; INTRAVENOUS at 22:01

## 2023-01-21 RX ADMIN — Medication 3 ML: at 21:55

## 2023-01-21 RX ADMIN — PROPRANOLOL HYDROCHLORIDE 20 MG: 20 TABLET ORAL at 10:35

## 2023-01-21 RX ADMIN — PROPRANOLOL HYDROCHLORIDE 20 MG: 20 TABLET ORAL at 17:26

## 2023-01-21 RX ADMIN — MIDODRINE HYDROCHLORIDE 2.5 MG: 5 TABLET ORAL at 11:51

## 2023-01-21 RX ADMIN — MIDODRINE HYDROCHLORIDE 2.5 MG: 5 TABLET ORAL at 08:46

## 2023-01-21 RX ADMIN — SODIUM CHLORIDE 100 ML/HR: 9 INJECTION, SOLUTION INTRAVENOUS at 17:36

## 2023-01-21 RX ADMIN — MIDODRINE HYDROCHLORIDE 2.5 MG: 5 TABLET ORAL at 17:26

## 2023-01-21 RX ADMIN — ACETAMINOPHEN 650 MG: 325 TABLET, FILM COATED ORAL at 17:31

## 2023-01-21 RX ADMIN — LEVOTHYROXINE SODIUM 75 MCG: 0.07 TABLET ORAL at 05:13

## 2023-01-21 RX ADMIN — PROPRANOLOL HYDROCHLORIDE 20 MG: 20 TABLET ORAL at 21:47

## 2023-01-21 RX ADMIN — PANTOPRAZOLE SODIUM 40 MG: 40 INJECTION, POWDER, LYOPHILIZED, FOR SOLUTION INTRAVENOUS at 10:17

## 2023-01-21 RX ADMIN — DOXYCYCLINE 100 MG: 100 INJECTION, POWDER, LYOPHILIZED, FOR SOLUTION INTRAVENOUS at 22:35

## 2023-01-21 RX ADMIN — Medication 3 ML: at 09:06

## 2023-01-22 ENCOUNTER — ANESTHESIA EVENT (OUTPATIENT)
Dept: GASTROENTEROLOGY | Facility: HOSPITAL | Age: 33
End: 2023-01-22
Payer: COMMERCIAL

## 2023-01-22 ENCOUNTER — ANESTHESIA (OUTPATIENT)
Dept: GASTROENTEROLOGY | Facility: HOSPITAL | Age: 33
End: 2023-01-22
Payer: COMMERCIAL

## 2023-01-22 VITALS
HEART RATE: 86 BPM | SYSTOLIC BLOOD PRESSURE: 120 MMHG | WEIGHT: 220 LBS | DIASTOLIC BLOOD PRESSURE: 76 MMHG | OXYGEN SATURATION: 97 % | TEMPERATURE: 98.3 F | HEIGHT: 71 IN | RESPIRATION RATE: 27 BRPM | BODY MASS INDEX: 30.8 KG/M2

## 2023-01-22 LAB
ANION GAP SERPL CALCULATED.3IONS-SCNC: 10 MMOL/L (ref 5–15)
B PARAPERT DNA SPEC QL NAA+PROBE: NOT DETECTED
B PERT DNA SPEC QL NAA+PROBE: NOT DETECTED
BASOPHILS # BLD AUTO: 0 10*3/MM3 (ref 0–0.2)
BASOPHILS NFR BLD AUTO: 0.8 % (ref 0–1.5)
BUN SERPL-MCNC: 7 MG/DL (ref 6–20)
BUN/CREAT SERPL: 11.1 (ref 7–25)
C PNEUM DNA NPH QL NAA+NON-PROBE: NOT DETECTED
CALCIUM SPEC-SCNC: 7.8 MG/DL (ref 8.6–10.5)
CHLORIDE SERPL-SCNC: 107 MMOL/L (ref 98–107)
CO2 SERPL-SCNC: 22 MMOL/L (ref 22–29)
CREAT SERPL-MCNC: 0.63 MG/DL (ref 0.57–1)
DEPRECATED RDW RBC AUTO: 41.1 FL (ref 37–54)
EGFRCR SERPLBLD CKD-EPI 2021: 120.3 ML/MIN/1.73
EOSINOPHIL # BLD AUTO: 0.2 10*3/MM3 (ref 0–0.4)
EOSINOPHIL NFR BLD AUTO: 5.6 % (ref 0.3–6.2)
ERYTHROCYTE [DISTWIDTH] IN BLOOD BY AUTOMATED COUNT: 13.7 % (ref 12.3–15.4)
FLUAV SUBTYP SPEC NAA+PROBE: NOT DETECTED
FLUBV RNA ISLT QL NAA+PROBE: NOT DETECTED
GLUCOSE SERPL-MCNC: 89 MG/DL (ref 65–99)
HADV DNA SPEC NAA+PROBE: DETECTED
HCOV 229E RNA SPEC QL NAA+PROBE: NOT DETECTED
HCOV HKU1 RNA SPEC QL NAA+PROBE: NOT DETECTED
HCOV NL63 RNA SPEC QL NAA+PROBE: NOT DETECTED
HCOV OC43 RNA SPEC QL NAA+PROBE: NOT DETECTED
HCT VFR BLD AUTO: 34 % (ref 34–46.6)
HGB BLD-MCNC: 11.5 G/DL (ref 12–15.9)
HMPV RNA NPH QL NAA+NON-PROBE: NOT DETECTED
HPIV1 RNA ISLT QL NAA+PROBE: NOT DETECTED
HPIV2 RNA SPEC QL NAA+PROBE: NOT DETECTED
HPIV3 RNA NPH QL NAA+PROBE: NOT DETECTED
HPIV4 P GENE NPH QL NAA+PROBE: NOT DETECTED
LYMPHOCYTES # BLD AUTO: 1.1 10*3/MM3 (ref 0.7–3.1)
LYMPHOCYTES NFR BLD AUTO: 34.4 % (ref 19.6–45.3)
M PNEUMO IGG SER IA-ACNC: NOT DETECTED
MAGNESIUM SERPL-MCNC: 1.8 MG/DL (ref 1.6–2.6)
MCH RBC QN AUTO: 29.2 PG (ref 26.6–33)
MCHC RBC AUTO-ENTMCNC: 33.9 G/DL (ref 31.5–35.7)
MCV RBC AUTO: 86 FL (ref 79–97)
MONOCYTES # BLD AUTO: 0.7 10*3/MM3 (ref 0.1–0.9)
MONOCYTES NFR BLD AUTO: 20.5 % (ref 5–12)
NEUTROPHILS NFR BLD AUTO: 1.2 10*3/MM3 (ref 1.7–7)
NEUTROPHILS NFR BLD AUTO: 38.7 % (ref 42.7–76)
NRBC BLD AUTO-RTO: 0.2 /100 WBC (ref 0–0.2)
PLATELET # BLD AUTO: 215 10*3/MM3 (ref 140–450)
PMV BLD AUTO: 7.3 FL (ref 6–12)
POTASSIUM SERPL-SCNC: 3.6 MMOL/L (ref 3.5–5.2)
RBC # BLD AUTO: 3.95 10*6/MM3 (ref 3.77–5.28)
RHINOVIRUS RNA SPEC NAA+PROBE: DETECTED
RSV RNA NPH QL NAA+NON-PROBE: NOT DETECTED
SARS-COV-2 RNA NPH QL NAA+NON-PROBE: NOT DETECTED
SODIUM SERPL-SCNC: 139 MMOL/L (ref 136–145)
WBC NRBC COR # BLD: 3.2 10*3/MM3 (ref 3.4–10.8)

## 2023-01-22 PROCEDURE — 36415 COLL VENOUS BLD VENIPUNCTURE: CPT | Performed by: FAMILY MEDICINE

## 2023-01-22 PROCEDURE — 96376 TX/PRO/DX INJ SAME DRUG ADON: CPT

## 2023-01-22 PROCEDURE — 87102 FUNGUS ISOLATION CULTURE: CPT | Performed by: INTERNAL MEDICINE

## 2023-01-22 PROCEDURE — 88108 CYTOPATH CONCENTRATE TECH: CPT | Performed by: INTERNAL MEDICINE

## 2023-01-22 PROCEDURE — 87798 DETECT AGENT NOS DNA AMP: CPT | Performed by: INTERNAL MEDICINE

## 2023-01-22 PROCEDURE — 87070 CULTURE OTHR SPECIMN AEROBIC: CPT | Performed by: INTERNAL MEDICINE

## 2023-01-22 PROCEDURE — 83735 ASSAY OF MAGNESIUM: CPT | Performed by: FAMILY MEDICINE

## 2023-01-22 PROCEDURE — 87252 VIRUS INOCULATION TISSUE: CPT | Performed by: INTERNAL MEDICINE

## 2023-01-22 PROCEDURE — 85025 COMPLETE CBC W/AUTO DIFF WBC: CPT | Performed by: FAMILY MEDICINE

## 2023-01-22 PROCEDURE — 87206 SMEAR FLUORESCENT/ACID STAI: CPT | Performed by: INTERNAL MEDICINE

## 2023-01-22 PROCEDURE — G0378 HOSPITAL OBSERVATION PER HR: HCPCS

## 2023-01-22 PROCEDURE — 25010000002 PROPOFOL 200 MG/20ML EMULSION: Performed by: ANESTHESIOLOGY

## 2023-01-22 PROCEDURE — 80048 BASIC METABOLIC PNL TOTAL CA: CPT | Performed by: FAMILY MEDICINE

## 2023-01-22 PROCEDURE — 87205 SMEAR GRAM STAIN: CPT | Performed by: INTERNAL MEDICINE

## 2023-01-22 PROCEDURE — 87116 MYCOBACTERIA CULTURE: CPT | Performed by: INTERNAL MEDICINE

## 2023-01-22 PROCEDURE — 0202U NFCT DS 22 TRGT SARS-COV-2: CPT | Performed by: INTERNAL MEDICINE

## 2023-01-22 PROCEDURE — 96361 HYDRATE IV INFUSION ADD-ON: CPT

## 2023-01-22 RX ORDER — CEFDINIR 300 MG/1
300 CAPSULE ORAL 2 TIMES DAILY
Qty: 20 CAPSULE | Refills: 0 | Status: SHIPPED | OUTPATIENT
Start: 2023-01-22

## 2023-01-22 RX ORDER — LIDOCAINE HYDROCHLORIDE 20 MG/ML
INJECTION, SOLUTION INFILTRATION; PERINEURAL AS NEEDED
Status: DISCONTINUED | OUTPATIENT
Start: 2023-01-22 | End: 2023-01-22 | Stop reason: HOSPADM

## 2023-01-22 RX ORDER — LIDOCAINE 50 MG/G
OINTMENT TOPICAL AS NEEDED
Status: DISCONTINUED | OUTPATIENT
Start: 2023-01-22 | End: 2023-01-22 | Stop reason: HOSPADM

## 2023-01-22 RX ORDER — LIDOCAINE HYDROCHLORIDE 10 MG/ML
INJECTION, SOLUTION EPIDURAL; INFILTRATION; INTRACAUDAL; PERINEURAL AS NEEDED
Status: DISCONTINUED | OUTPATIENT
Start: 2023-01-22 | End: 2023-01-22 | Stop reason: SURG

## 2023-01-22 RX ORDER — DOXYCYCLINE HYCLATE 100 MG/1
100 CAPSULE ORAL 2 TIMES DAILY
Qty: 20 CAPSULE | Refills: 0 | Status: SHIPPED | OUTPATIENT
Start: 2023-01-22

## 2023-01-22 RX ORDER — PROPOFOL 10 MG/ML
INJECTION, EMULSION INTRAVENOUS AS NEEDED
Status: DISCONTINUED | OUTPATIENT
Start: 2023-01-22 | End: 2023-01-22 | Stop reason: SURG

## 2023-01-22 RX ORDER — SODIUM CHLORIDE 9 MG/ML
INJECTION, SOLUTION INTRAVENOUS CONTINUOUS PRN
Status: DISCONTINUED | OUTPATIENT
Start: 2023-01-22 | End: 2023-01-22 | Stop reason: SURG

## 2023-01-22 RX ADMIN — PANTOPRAZOLE SODIUM 40 MG: 40 INJECTION, POWDER, LYOPHILIZED, FOR SOLUTION INTRAVENOUS at 05:20

## 2023-01-22 RX ADMIN — PROPOFOL 240 MG: 10 INJECTION, EMULSION INTRAVENOUS at 09:30

## 2023-01-22 RX ADMIN — Medication 3 ML: at 08:54

## 2023-01-22 RX ADMIN — SODIUM CHLORIDE: 0.9 INJECTION, SOLUTION INTRAVENOUS at 09:25

## 2023-01-22 RX ADMIN — LIDOCAINE HYDROCHLORIDE 50 MG: 10 INJECTION, SOLUTION EPIDURAL; INFILTRATION; INTRACAUDAL; PERINEURAL at 09:29

## 2023-01-22 RX ADMIN — SODIUM CHLORIDE 100 ML/HR: 9 INJECTION, SOLUTION INTRAVENOUS at 05:20

## 2023-01-22 NOTE — ANESTHESIA PREPROCEDURE EVALUATION
Anesthesia Evaluation     Patient summary reviewed and Nursing notes reviewed   NPO Solid Status: > 8 hours  NPO Liquid Status: > 8 hours           Airway   Mallampati: I  TM distance: >3 FB  Neck ROM: full  No difficulty expected  Dental - normal exam     Pulmonary    (+) pneumonia stable , rhonchi,     ROS comment: CXR 1/20/23:  Perihilar opacities with asymmetric involvement of the right lung base concerning for pneumonia  Cardiovascular - negative cardio ROS and normal exam      ROS comment: POTS    Neuro/Psych- negative ROS  GI/Hepatic/Renal/Endo    (+)   thyroid problem hypothyroidism    Musculoskeletal (-) negative ROS    Abdominal  - normal exam    Bowel sounds: normal.   Substance History - negative use     OB/GYN negative ob/gyn ROS         Other                      Anesthesia Plan    ASA 2     MAC     intravenous induction     Anesthetic plan, risks, benefits, and alternatives have been provided, discussed and informed consent has been obtained with: patient.        CODE STATUS:    Code Status (Patient has no pulse and is not breathing): CPR (Attempt to Resuscitate)  Medical Interventions (Patient has pulse or is breathing): Full Support

## 2023-01-22 NOTE — ANESTHESIA POSTPROCEDURE EVALUATION
Patient: Deepa Garcia    Procedure Summary     Date: 01/22/23 Room / Location: [facility] ENDOSCOPY 1 / [facility] ENDOSCOPY    Anesthesia Start: 0928 Anesthesia Stop: 0941    Procedure: BRONCHOSCOPY (Bronchus) Diagnosis:       Pneumonia due to infectious organism, unspecified laterality, unspecified part of lung      Hemoptysis      (Pneumonia due to infectious organism, unspecified laterality, unspecified part of lung [J18.9])      (Hemoptysis [R04.2])    Surgeons: Kings Houston MD Provider: Lisandro Royal MD    Anesthesia Type: MAC ASA Status: 2          Anesthesia Type: MAC    Vitals  Vitals Value Taken Time   /79 01/22/23 0941   Temp     Pulse 101 01/22/23 0941   Resp 21 01/22/23 0941   SpO2 98 % 01/22/23 0941           Post Anesthesia Care and Evaluation    Patient location during evaluation: PACU  Patient participation: complete - patient participated  Level of consciousness: awake  Pain scale: See nurse's notes for pain score.  Pain management: adequate    Airway patency: patent  Anesthetic complications: No anesthetic complications  PONV Status: none  Cardiovascular status: acceptable  Respiratory status: acceptable  Hydration status: acceptable    Comments: Patient seen and examined postoperatively; vital signs stable; SpO2 greater than or equal to 90%; cardiopulmonary status stable; nausea/vomiting adequately controlled; pain adequately controlled; no apparent anesthesia complications; patient discharged from anesthesia care when discharge criteria were met

## 2023-01-24 LAB
BACTERIA SPEC RESP CULT: NORMAL
GRAM STN SPEC: NORMAL
LAB AP CASE REPORT: NORMAL
PATH REPORT.FINAL DX SPEC: NORMAL
PATH REPORT.GROSS SPEC: NORMAL

## 2023-01-25 LAB
BACTERIA SPEC AEROBE CULT: NORMAL
BACTERIA SPEC AEROBE CULT: NORMAL

## 2023-01-26 LAB
P JIROVECII DNA L RESP QL NAA+NON-PROBE: NEGATIVE
REF LAB TEST METHOD: NORMAL

## 2023-02-02 LAB — VIRUS SPEC CULT: NORMAL

## 2023-02-19 LAB — FUNGUS WND CULT: NORMAL

## 2023-03-05 LAB
MYCOBACTERIUM SPEC CULT: NORMAL
NIGHT BLUE STAIN TISS: NORMAL

## 2024-08-21 ENCOUNTER — TRANSCRIBE ORDERS (OUTPATIENT)
Dept: ADMINISTRATIVE | Facility: HOSPITAL | Age: 34
End: 2024-08-21
Payer: COMMERCIAL

## 2024-08-21 DIAGNOSIS — I49.9 CARDIAC ARRHYTHMIA, UNSPECIFIED CARDIAC ARRHYTHMIA TYPE: Primary | ICD-10-CM

## 2024-08-23 ENCOUNTER — HOSPITAL ENCOUNTER (OUTPATIENT)
Dept: RESPIRATORY THERAPY | Facility: HOSPITAL | Age: 34
Discharge: HOME | End: 2024-08-23
Payer: COMMERCIAL

## 2024-08-23 DIAGNOSIS — I49.9 CARDIAC ARRHYTHMIA, UNSPECIFIED CARDIAC ARRHYTHMIA TYPE: ICD-10-CM

## 2024-08-23 PROCEDURE — 93246 EXT ECG>7D<15D RECORDING: CPT

## 2024-08-27 ENCOUNTER — PATIENT ROUNDING (BHMG ONLY) (OUTPATIENT)
Dept: CARDIOLOGY | Facility: CLINIC | Age: 34
End: 2024-08-27
Payer: COMMERCIAL

## 2024-08-27 ENCOUNTER — OFFICE VISIT (OUTPATIENT)
Dept: CARDIOLOGY | Facility: CLINIC | Age: 34
End: 2024-08-27
Payer: COMMERCIAL

## 2024-08-27 VITALS
HEART RATE: 70 BPM | HEIGHT: 71 IN | SYSTOLIC BLOOD PRESSURE: 114 MMHG | RESPIRATION RATE: 18 BRPM | DIASTOLIC BLOOD PRESSURE: 78 MMHG | BODY MASS INDEX: 30.8 KG/M2 | WEIGHT: 220 LBS

## 2024-08-27 DIAGNOSIS — R00.2 PALPITATIONS: Primary | ICD-10-CM

## 2024-08-27 PROCEDURE — 93000 ELECTROCARDIOGRAM COMPLETE: CPT | Performed by: INTERNAL MEDICINE

## 2024-08-27 PROCEDURE — 99204 OFFICE O/P NEW MOD 45 MIN: CPT | Performed by: INTERNAL MEDICINE

## 2024-08-27 RX ORDER — SEMAGLUTIDE 0.25 MG/.5ML
INJECTION, SOLUTION SUBCUTANEOUS
COMMUNITY
Start: 2024-08-16

## 2024-09-09 NOTE — PROGRESS NOTES
"Cardiology Clinic Note  Kelvin Brown MD, PhD    Subjective:     Encounter Date:08/27/2024      Patient ID: Deepa Garcia is a 34 y.o. female.    Chief Complaint:  Chief Complaint   Patient presents with    Palpitations    Dizziness       HPI:  I had the pleasure to see this 34-year-old as a new patient complaining of dizziness and palpitations.  Has history of the [employer] loop recorder which is nonfunctional.  History of paroxysmal orthostatic tachycardia, SVT.  Last stress and echo in 2007 and 2019 respectively.  Mother with hypertension she is a non-smoker she is not diabetic.  She continues to have palpitations causing discomfort and follow-up referred by primary care for evaluation and is new to our clinic today.  She has a Holter monitor in place.  We discussed correlation with 2D echo as nothing in the last 5 years.  We discussed coronary calcium scanning starting at age 40, routine diet and exercise per AHA guidelines as well as heart healthy lifestyle.  Good sleep health, minimizing caffeine and stimulants.  She is on levothyroxine last TSH was okay, she is also midodrine 2-3 times a day with history of POTS and low-dose propranolol    Review of systems otherwise negative x 14 point review of systems except as mentioned above  Historical data copied forward from previous encounters in EMR including the history, exam, and assessment/plan has been reviewed and is unchanged unless noted otherwise.    Cardiac medicines reviewed with risk, benefits, and necessity of each discussed.    Risk and benefit of cardiac testing reviewed including death heart attack stroke pain bleeding infection need for vascular /cardiovascular surgery were discussed and the patient     Objective:         /78 (BP Location: Right arm, Patient Position: Sitting)   Pulse 70   Resp 18   Ht 180.3 cm (71\")   Wt 99.8 kg (220 lb)   BMI 30.68 kg/m²     Physical Exam  Regular rate and rhythm no rubs murmurs or gallops  No heave or " lift  No clubbing cyanosis or edema  Normal affect  Intact grossly  Clear to auscultation  Assessment:       Preventative health care  POTS  Orthostatic hypotension  Palpitations  Hypothyroidism  History of SVT paroxysmal atrial tachycardia    Continue midodrine and propranolol for now  Correlate with monitor findings  2D echo for structural and functional evaluation  Can replace loop recorder if needed at a later date  Diet and exercise per AHA guidelines and heart healthy lifestyle      Diagnoses and all orders for this visit:    1. Palpitations (Primary)  -     Adult Transthoracic Echo Complete W/ Color, Spectral and Contrast if Necessary Per Protocol; Future                The pleasure to be involved in this patient's cardiovascular care.  Please call with any questions or concerns  Kelvin Brown MD, PhD    Most recent EKG as reviewed and interpreted by me:    ECG 12 Lead    Date/Time: 8/27/2024 1:06 PM  Performed by: Kelvin Brown MD    Authorized by: Kelvin Brown MD  Comparison: not compared with previous ECG   Previous ECG: no previous ECG available  Rhythm: sinus rhythm  Ectopy: atrial premature contractions  Rate: normal  QRS axis: normal    Clinical impression: non-specific ECG           Most recent echo as reviewed and interpreted by me:      Most recent stress test as reviewed and interpreted by me:      Most recent cardiac catheterization as reviewed interpreted by me:  No results found for this or any previous visit.    The following portions of the patient's history were reviewed and updated as appropriate: allergies, current medications, past family history, past medical history, past social history, past surgical history, and problem list.      ROS:  14 point review of systems negative except as mentioned above    Current Outpatient Medications:     levothyroxine (SYNTHROID, LEVOTHROID) 75 MCG tablet, Take 1 tablet by mouth Daily., Disp: , Rfl:     midodrine (PROAMATINE) 2.5  MG tablet, Take 2 tablets by mouth 3 (Three) Times a Day., Disp: , Rfl:     ondansetron (ZOFRAN) 4 MG tablet, Take 1 tablet by mouth Every 6 (Six) Hours As Needed for Nausea or Vomiting., Disp: 20 tablet, Rfl: 0    propranolol (INDERAL) 10 MG tablet, Take 2 tablets by mouth 3 (Three) Times a Day., Disp: , Rfl:     Semaglutide-Weight Management (Wegovy) 0.25 MG/0.5ML solution auto-injector, , Disp: , Rfl:     Problem List:  Patient Active Problem List   Diagnosis    Dehydration    Pott's disease    Pneumonia due to infectious organism, unspecified laterality, unspecified part of lung    Hemoptysis     Past Medical History:  Past Medical History:   Diagnosis Date    Arrhythmia     SVT    Dehydration     Hypothyroidism     POTS (postural orthostatic tachycardia syndrome)      Past Surgical History:  Past Surgical History:   Procedure Laterality Date    ANKLE SURGERY      BRONCHOSCOPY N/A 2023    Procedure: BRONCHOSCOPY with lung wash;  Surgeon: Kings Houston MD;  Location: [facility] ENDOSCOPY;  Service: Pulmonary;  Laterality: N/A;    CARDIAC ASSIST DEVICE INSERTION      Loop recorder     SECTION      CHOLECYSTECTOMY      FERTILITY SURGERY      IVF    TONSILLECTOMY      TUBAL ABDOMINAL LIGATION       Social History:  Social History     Socioeconomic History    Marital status:    Tobacco Use    Smoking status: Never    Smokeless tobacco: Never   Vaping Use    Vaping status: Never Used   Substance and Sexual Activity    Alcohol use: Not Currently    Drug use: Defer    Sexual activity: Defer     Allergies:  Allergies   Allergen Reactions    Metoprolol Rash     Immunizations:  Immunization History   Administered Date(s) Administered    COVID-19 (PFIZER) Purple Cap Monovalent 03/10/2021, 2021            In-Office Procedure(s):  No orders to display        ASCVD RIsk Score::  The ASCVD Risk score (Bria DK, et al., 2019) failed to calculate for the following reasons:    The 2019 ASCVD risk score is only  valid for ages 40 to 79    Imaging:    Results for orders placed during the hospital encounter of 01/20/23    XR Chest 1 View    Narrative  XR CHEST 1 VW    Date of Exam: 1/20/2023 6:30 PM EST    Indication: CHF/COPD Protocol  CHF/COPD Protocol.    Comparison: None available.    Findings:  Heart size appears within normal limits. There is prominence of the perihilar markings bilaterally. Patchy infrahilar opacities noted at the right base. No pneumothorax or pleural effusion noted. Osseous structures are unremarkable    Impression  Impression:  Perihilar opacities with asymmetric involvement of the right lung base concerning for pneumonia    Electronically Signed: Wayne Carey  1/20/2023 7:12 PM EST  Workstation ID: [id number]       Results for orders placed during the hospital encounter of 01/20/23    CT Angiogram Chest Pulmonary Embolism    Narrative  CT ANGIOGRAM CHEST PULMONARY EMBOLISM    Date of Exam: 1/20/2023 8:15 PM EST    Indication: HEMOPTYSIS DYSPNEA.    Comparison: None available.    Technique: Axial CT images were obtained of the chest before and after the uneventful intravenous administration of iodinated contrast utilizing pulmonary embolism protocol.  Sagittal and coronal reconstructions were performed.  Automated exposure  control and iterative reconstruction methods were used.    Of note images were repeated due to motion artifact and inadequate contrast bolus.      FINDINGS:    Study remains somewhat motion limited. Contrast bolus is adequate.    Pulmonary Arteries: Study is limited by motion and both series of imaging sequences. There is no evidence of a central pulmonary embolus through the lobar levels are suspected within the segmental levels. Subsegmental levels are more limited    Mediastinum: Motion Limited imaging demonstrates a normal heart size.    The aorta and origin of the great vessels is grossly unremarkable. Mild hilar adenopathy noted bilaterally is likely reactive.    Limited  imaging of the base of the neck and the esophagus is grossly unremarkable in appearance    Lungs/pleura: Trace amount of pleural fluid noted bilaterally.    Motion Limited imaging of the lung parenchyma demonstrates patchy peribronchial opacities at the lung bases bilaterally concerning for pneumonia    Upper Abdomen: There is no note of splenomegaly is noted. Limited imaging of the upper abdomen demonstrates no acute abnormality    Soft tissues/Bones: No acute bone or soft tissue abnormality.    Impression  1. Motion Limited exam  2. No evidence of a central pulmonary unless. Distal vessels are limited.  3. Mild bibasilar airspace disease suggesting pneumonia          Electronically Signed: Wayne Carey  1/20/2023 9:16 PM EST  Workstation ID: [id number]      Results for orders placed during the hospital encounter of 01/20/23    CT Angiogram Chest Pulmonary Embolism    Narrative  CT ANGIOGRAM CHEST PULMONARY EMBOLISM    Date of Exam: 1/20/2023 8:15 PM EST    Indication: HEMOPTYSIS DYSPNEA.    Comparison: None available.    Technique: Axial CT images were obtained of the chest before and after the uneventful intravenous administration of iodinated contrast utilizing pulmonary embolism protocol.  Sagittal and coronal reconstructions were performed.  Automated exposure  control and iterative reconstruction methods were used.    Of note images were repeated due to motion artifact and inadequate contrast bolus.      FINDINGS:    Study remains somewhat motion limited. Contrast bolus is adequate.    Pulmonary Arteries: Study is limited by motion and both series of imaging sequences. There is no evidence of a central pulmonary embolus through the lobar levels are suspected within the segmental levels. Subsegmental levels are more limited    Mediastinum: Motion Limited imaging demonstrates a normal heart size.    The aorta and origin of the great vessels is grossly unremarkable. Mild hilar adenopathy noted bilaterally is  likely reactive.    Limited imaging of the base of the neck and the esophagus is grossly unremarkable in appearance    Lungs/pleura: Trace amount of pleural fluid noted bilaterally.    Motion Limited imaging of the lung parenchyma demonstrates patchy peribronchial opacities at the lung bases bilaterally concerning for pneumonia    Upper Abdomen: There is no note of splenomegaly is noted. Limited imaging of the upper abdomen demonstrates no acute abnormality    Soft tissues/Bones: No acute bone or soft tissue abnormality.    Impression  1. Motion Limited exam  2. No evidence of a central pulmonary unless. Distal vessels are limited.  3. Mild bibasilar airspace disease suggesting pneumonia          Electronically Signed: Wayne Carey  1/20/2023 9:16 PM EST  Workstation ID: [id number]      Lab Review:   No visits with results within 6 Month(s) from this visit.   Latest known visit with results is:   Admission on 01/20/2023, Discharged on 01/22/2023   Component Date Value    TSH 01/20/2023 1.650     QT Interval 01/20/2023 287     Glucose 01/20/2023 90     BUN 01/20/2023 19     Creatinine 01/20/2023 0.91     Sodium 01/20/2023 135 (L)     Potassium 01/20/2023 4.1     Chloride 01/20/2023 100     CO2 01/20/2023 23.0     Calcium 01/20/2023 9.6     Total Protein 01/20/2023 7.2     Albumin 01/20/2023 4.4     ALT (SGPT) 01/20/2023 11     AST (SGOT) 01/20/2023 15     Alkaline Phosphatase 01/20/2023 62     Total Bilirubin 01/20/2023 0.7     Globulin 01/20/2023 2.8     A/G Ratio 01/20/2023 1.6     BUN/Creatinine Ratio 01/20/2023 20.9     Anion Gap 01/20/2023 12.0     eGFR 01/20/2023 85.6     proBNP 01/20/2023 44.6     Troponin T 01/20/2023 <0.010     WBC 01/20/2023 4.70     RBC 01/20/2023 4.45     Hemoglobin 01/20/2023 13.3     Hematocrit 01/20/2023 37.1     MCV 01/20/2023 83.3     MCH 01/20/2023 29.9     MCHC 01/20/2023 35.9 (H)     RDW 01/20/2023 13.3     RDW-SD 01/20/2023 41.1     MPV 01/20/2023 7.1     Platelets 01/20/2023  245     Neutrophil % 01/20/2023 59.4     Lymphocyte % 01/20/2023 23.8     Monocyte % 01/20/2023 13.0 (H)     Eosinophil % 01/20/2023 2.9     Basophil % 01/20/2023 0.9     Neutrophils, Absolute 01/20/2023 2.80     Lymphocytes, Absolute 01/20/2023 1.10     Monocytes, Absolute 01/20/2023 0.60     Eosinophils, Absolute 01/20/2023 0.10     Basophils, Absolute 01/20/2023 0.00     nRBC 01/20/2023 0.1     Blood Culture 01/20/2023 No growth at 5 days     Blood Culture 01/20/2023 No growth at 5 days     ADENOVIRUS, PCR 01/20/2023 Not Detected     Coronavirus 229E 01/20/2023 Not Detected     Coronavirus HKU1 01/20/2023 Not Detected     Coronavirus NL63 01/20/2023 Not Detected     Coronavirus OC43 01/20/2023 Not Detected     COVID19 01/20/2023 Not Detected     Human Metapneumovirus 01/20/2023 Not Detected     Human Rhinovirus/Enterov* 01/20/2023 Detected (A)     Influenza A PCR 01/20/2023 Not Detected     Influenza B PCR 01/20/2023 Not Detected     Parainfluenza Virus 1 01/20/2023 Not Detected     Parainfluenza Virus 2 01/20/2023 Not Detected     Parainfluenza Virus 3 01/20/2023 Not Detected     Parainfluenza Virus 4 01/20/2023 Not Detected     RSV, PCR 01/20/2023 Not Detected     Bordetella pertussis pcr 01/20/2023 Not Detected     Bordetella parapertussis* 01/20/2023 Not Detected     Chlamydophila pneumoniae* 01/20/2023 Not Detected     Mycoplasma pneumo by PCR 01/20/2023 Not Detected     Lactate 01/20/2023 0.6     Glucose 01/20/2023 111 (H)     BUN 01/20/2023 15     Creatinine 01/20/2023 0.81     Sodium 01/20/2023 135 (L)     Potassium 01/20/2023 3.5     Chloride 01/20/2023 102     CO2 01/20/2023 23.0     Calcium 01/20/2023 8.6     BUN/Creatinine Ratio 01/20/2023 18.5     Anion Gap 01/20/2023 10.0     eGFR 01/20/2023 98.4     Magnesium 01/20/2023 1.7     WBC 01/20/2023 5.00     RBC 01/20/2023 4.09     Hemoglobin 01/20/2023 12.1     Hematocrit 01/20/2023 34.1     MCV 01/20/2023 83.4     MCH 01/20/2023 29.6     MCHC 01/20/2023  35.5     RDW 01/20/2023 13.4     RDW-SD 01/20/2023 41.6     MPV 01/20/2023 7.4     Platelets 01/20/2023 229     Neutrophil % 01/20/2023 57.3     Lymphocyte % 01/20/2023 23.4     Monocyte % 01/20/2023 17.5 (H)     Eosinophil % 01/20/2023 1.3     Basophil % 01/20/2023 0.5     Neutrophils, Absolute 01/20/2023 2.90     Lymphocytes, Absolute 01/20/2023 1.20     Monocytes, Absolute 01/20/2023 0.90     Eosinophils, Absolute 01/20/2023 0.10     Basophils, Absolute 01/20/2023 0.00     nRBC 01/20/2023 0.0     Magnesium 01/22/2023 1.8     Glucose 01/22/2023 89     BUN 01/22/2023 7     Creatinine 01/22/2023 0.63     Sodium 01/22/2023 139     Potassium 01/22/2023 3.6     Chloride 01/22/2023 107     CO2 01/22/2023 22.0     Calcium 01/22/2023 7.8 (L)     BUN/Creatinine Ratio 01/22/2023 11.1     Anion Gap 01/22/2023 10.0     eGFR 01/22/2023 120.3     WBC 01/22/2023 3.20 (L)     RBC 01/22/2023 3.95     Hemoglobin 01/22/2023 11.5 (L)     Hematocrit 01/22/2023 34.0     MCV 01/22/2023 86.0     MCH 01/22/2023 29.2     MCHC 01/22/2023 33.9     RDW 01/22/2023 13.7     RDW-SD 01/22/2023 41.1     MPV 01/22/2023 7.3     Platelets 01/22/2023 215     Neutrophil % 01/22/2023 38.7 (L)     Lymphocyte % 01/22/2023 34.4     Monocyte % 01/22/2023 20.5 (H)     Eosinophil % 01/22/2023 5.6     Basophil % 01/22/2023 0.8     Neutrophils, Absolute 01/22/2023 1.20 (L)     Lymphocytes, Absolute 01/22/2023 1.10     Monocytes, Absolute 01/22/2023 0.70     Eosinophils, Absolute 01/22/2023 0.20     Basophils, Absolute 01/22/2023 0.00     nRBC 01/22/2023 0.2     Case Report 01/22/2023                      Value:Medical Cytology Report                           Case: [id number]                                  Authorizing Provider:  Kings Houston MD             Collected:           01/22/2023 09:34 AM          Ordering Location:     [facility]  Received:            01/23/2023 08:10 AM                                 SUITES                                                                        Pathologist:           Tal Restrepo MD                                                             Specimen:    Lung, R                                                                                    Final Diagnosis 01/22/2023                      Value:This result contains rich text formatting which cannot be displayed here.    Gross Description 01/22/2023                      Value:This result contains rich text formatting which cannot be displayed here.    Fungus Culture 01/22/2023 No fungus isolated at 4 weeks     Viral Culture, General 01/22/2023 No virus isolated.     AFB Culture 01/22/2023 No AFB isolated at 6 weeks     AFB Stain 01/22/2023 No acid fast bacilli seen on concentrated smear     Respiratory Culture 01/22/2023 Light growth (2+) Normal respiratory deanna. No S. aureus or Pseudomonas aeruginosa detected. Final report.     Gram Stain 01/22/2023 Many (4+) WBCs per low power field     Gram Stain 01/22/2023 Rare (1+) Epithelial cells per low power field     Gram Stain 01/22/2023 Few (2+) Mixed bacterial morphotypes seen on Gram Stain     Reference Lab Report 01/22/2023 See Attached Report     Pneumocystis 01/22/2023 Negative     ADENOVIRUS, PCR 01/22/2023 Detected (A)     Coronavirus 229E 01/22/2023 Not Detected     Coronavirus HKU1 01/22/2023 Not Detected     Coronavirus NL63 01/22/2023 Not Detected     Coronavirus OC43 01/22/2023 Not Detected     COVID19 01/22/2023 Not Detected     Human Metapneumovirus 01/22/2023 Not Detected     Human Rhinovirus/Enterov* 01/22/2023 Detected (A)     Influenza A PCR 01/22/2023 Not Detected     Influenza B PCR 01/22/2023 Not Detected     Parainfluenza Virus 1 01/22/2023 Not Detected     Parainfluenza Virus 2 01/22/2023 Not Detected     Parainfluenza Virus 3 01/22/2023 Not Detected     Parainfluenza Virus 4 01/22/2023 Not Detected     RSV, PCR 01/22/2023 Not Detected     Bordetella pertussis pcr 01/22/2023 Not Detected      Bordetella parapertussis* 01/22/2023 Not Detected     Chlamydophila pneumoniae* 01/22/2023 Not Detected     Mycoplasma pneumo by PCR 01/22/2023 Not Detected      Recent labs reviewed and interpreted for clinical significance and application            Level of Care:           Kelvin Brown MD  09/09/24  .

## 2024-09-27 ENCOUNTER — TELEPHONE (OUTPATIENT)
Dept: CARDIOLOGY | Facility: CLINIC | Age: 34
End: 2024-09-27
Payer: COMMERCIAL

## 2024-09-27 DIAGNOSIS — I47.10 PSVT (PAROXYSMAL SUPRAVENTRICULAR TACHYCARDIA): Primary | ICD-10-CM

## 2024-09-27 NOTE — TELEPHONE ENCOUNTER
Caller: Deepa Garcia    Relationship to patient: Self    Best call back number: [phone]    Patient is needing: PT CALLING IN, SAID DR CORTES WAS REFERRING PT TO DR VILLA BUT THERE IS NO REFERRAL IN CHART. OFFICE ADVISED ME TO SEND IN TE BECAUSE THEY WOULD HAVE TO BE THE ONES TO MAKE THE REFERRAL.

## 2024-10-02 ENCOUNTER — OFFICE VISIT (OUTPATIENT)
Dept: CARDIOLOGY | Facility: CLINIC | Age: 34
End: 2024-10-02
Payer: COMMERCIAL

## 2024-10-02 ENCOUNTER — PATIENT ROUNDING (BHMG ONLY) (OUTPATIENT)
Dept: CARDIOLOGY | Facility: CLINIC | Age: 34
End: 2024-10-02

## 2024-10-02 VITALS
HEART RATE: 114 BPM | WEIGHT: 217.6 LBS | BODY MASS INDEX: 30.46 KG/M2 | DIASTOLIC BLOOD PRESSURE: 74 MMHG | HEIGHT: 71 IN | OXYGEN SATURATION: 97 % | SYSTOLIC BLOOD PRESSURE: 119 MMHG

## 2024-10-02 DIAGNOSIS — G90.A POTS (POSTURAL ORTHOSTATIC TACHYCARDIA SYNDROME): Primary | ICD-10-CM

## 2024-10-02 RX ORDER — IVABRADINE 5 MG/1
5 TABLET, FILM COATED ORAL 2 TIMES DAILY WITH MEALS
Qty: 60 TABLET | Refills: 3 | Status: SHIPPED | OUTPATIENT
Start: 2024-10-02 | End: 2024-10-02 | Stop reason: SDUPTHER

## 2024-10-02 RX ORDER — IVABRADINE 5 MG/1
5 TABLET, FILM COATED ORAL 2 TIMES DAILY WITH MEALS
Qty: 60 TABLET | Refills: 3 | Status: SHIPPED | OUTPATIENT
Start: 2024-10-02

## 2024-10-02 NOTE — PROGRESS NOTES
A My-Chart message has been sent to the patient [place] [facility] [place]

## 2024-10-02 NOTE — PROGRESS NOTES
HP      Name: Deepa Garcia ADMIT: (Not on file)   : 1990  PCP: Justine Watts MD    MRN: [id number] LOS: 0 days   AGE/SEX: 34 y.o. female  ROOM: Room/bed info not found     Chief Complaint   Patient presents with    Consult     New patient low HR and chest pain       Subjective        History of present illness  Deepa Garcia is a 34-year-old female patient who is here today to discuss about abnormal Holter monitor results.  Patient has a longstanding history of palpitations and tachycardia and she was diagnosed with POTS.  She had gone to [place] and since then she had been on propranolol and midodrine.  She has had episodes of lightheadedness, near syncopal episodes but she is able to avoid complete passing out.  Her heart rate still goes up to 190 at times even with being on propranolol.  She has had an EP study also which failed to induce arrhythmias, further reinforcing the diagnosis of POTS.    Past Medical History:   Diagnosis Date    Arrhythmia     SVT    Dehydration     Hypothyroidism     POTS (postural orthostatic tachycardia syndrome)      Past Surgical History:   Procedure Laterality Date    ANKLE SURGERY      BRONCHOSCOPY N/A 2023    Procedure: BRONCHOSCOPY with lung wash;  Surgeon: Kings Houston MD;  Location: [facility] ENDOSCOPY;  Service: Pulmonary;  Laterality: N/A;    CARDIAC ASSIST DEVICE INSERTION      Loop recorder     SECTION      CHOLECYSTECTOMY      FERTILITY SURGERY      IVF    TONSILLECTOMY      TUBAL ABDOMINAL LIGATION       Family History   Problem Relation Age of Onset    Hypertension Mother     Pulmonary embolism Father      Social History     Tobacco Use    Smoking status: Never     Passive exposure: Never    Smokeless tobacco: Never   Vaping Use    Vaping status: Never Used   Substance Use Topics    Alcohol use: Not Currently    Drug use: Defer     (Not in a hospital admission)    Allergies:  Metoprolol    Review of systems    Constitutional: Negative.     Respiratory and cardiovascular: As detailed in HPI section.  Gastrointestinal: Negative for constipation, nausea and vomiting negative for abdominal distention, abdominal pain and diarrhea.   Genitourinary: Negative for difficulty urinating and flank pain.   Musculoskeletal: Negative for arthralgias, joint swelling and myalgias.   Skin: Negative for color change, rash and wound.   Neurological: Negative for dizziness, syncope, weakness and headaches.   Hematological: Negative for adenopathy.   Psychiatric/Behavioral: Negative for confusion.   All other systems reviewed and are negative.    Physical Exam  VITALS REVIEWED    General:      well developed, in no acute distress.    Head:      normocephalic and atraumatic.    Eyes:      PERRL/EOM intact, conjunctiva and sclera clear with out nystagmus.    Neck:      no masses, thyromegaly,  trachea central with normal respiratory effort and PMI displaced laterally  Lungs:      Clear to auscultation bilaterally  Heart:       Regular rate and rhythm, no murmur  Msk:      no deformity or scoliosis noted of thoracic or lumbar spine.    Pulses:      pulses normal in all 4 extremities.    Extremities:       No lower extremity edema  Neurologic:      no focal deficits.   alert oriented x3  Skin:      intact without lesions or rashes.    Psych:      alert and cooperative; normal mood and affect; normal attention span and concentration.      Result Review :               Pertinent cardiac workup    EKG 8/27/2024 sinus rhythm with PAC  Holter monitor for 12 days and 23 hours starting on 8/23/2024 showed sinus rhythm, short runs of atrial ectopy.  There was 1 episode of AV block which happened at midnight for 1 cycle.      Procedures        Assessment and Plan      Deepa Garcia is a 34-year-old female patient who is here today to discuss about abnormal Holter monitor results.  Patient carries a diagnosis of POTS and she certainly has the symptoms to back it out.  She has extreme  tachycardia at times when she stands up or when she is walking, has near syncopal episodes.  The symptoms happen despite her being on midodrine 7.5 mg 3 times a day and propranolol 20 mg 3 times a day.  I personally reviewed the rhythm strips from the Zio patch and the AV block happened twice during the study period.  It was not prolonged and he could have been due to combination of high vagal tone and her being on propranolol.  I do not think she would need a pacemaker at this time.  Also since her symptoms are not controlled with the current medications, I would like to switch propranolol over to ivabradine if possible.  We also discussed about some of the other measures such as hydration, compression stockings and some exercises that could help with POTS.    Diagnoses and all orders for this visit:    1. POTS (postural orthostatic tachycardia syndrome) (Primary)    Other orders  -     Discontinue: ivabradine HCl (CORLANOR) 5 MG tablet tablet; Take 1 tablet by mouth 2 (Two) Times a Day With Meals.  Dispense: 60 tablet; Refill: 3           No follow-ups on file.  Patient was given instructions and counseling regarding her condition or for health maintenance advice. Please see specific information pulled into the AVS if appropriate.       Electronically signed by Catia Flores MD, 10/02/24, 3:16 PM EDT.

## 2025-02-10 ENCOUNTER — APPOINTMENT (OUTPATIENT)
Dept: CT IMAGING | Facility: HOSPITAL | Age: 35
End: 2025-02-10
Payer: COMMERCIAL

## 2025-02-10 ENCOUNTER — HOSPITAL ENCOUNTER (EMERGENCY)
Facility: HOSPITAL | Age: 35
Discharge: HOME | End: 2025-02-10
Attending: EMERGENCY MEDICINE | Admitting: EMERGENCY MEDICINE
Payer: COMMERCIAL

## 2025-02-10 VITALS
RESPIRATION RATE: 16 BRPM | TEMPERATURE: 99.1 F | HEART RATE: 85 BPM | HEIGHT: 71 IN | SYSTOLIC BLOOD PRESSURE: 137 MMHG | BODY MASS INDEX: 29.82 KG/M2 | OXYGEN SATURATION: 99 % | WEIGHT: 213 LBS | DIASTOLIC BLOOD PRESSURE: 87 MMHG

## 2025-02-10 DIAGNOSIS — R10.9 ABDOMINAL PAIN, UNSPECIFIED ABDOMINAL LOCATION: Primary | ICD-10-CM

## 2025-02-10 LAB
ALBUMIN SERPL-MCNC: 4.4 G/DL (ref 3.5–5.2)
ALBUMIN/GLOB SERPL: 1.5 G/DL
ALP SERPL-CCNC: 67 U/L (ref 39–117)
ALT SERPL W P-5'-P-CCNC: 49 U/L (ref 1–33)
ANION GAP SERPL CALCULATED.3IONS-SCNC: 5.3 MMOL/L (ref 5–15)
AST SERPL-CCNC: 42 U/L (ref 1–32)
BASOPHILS # BLD AUTO: 0.01 10*3/MM3 (ref 0–0.2)
BASOPHILS NFR BLD AUTO: 0.2 % (ref 0–1.5)
BILIRUB SERPL-MCNC: 0.8 MG/DL (ref 0–1.2)
BILIRUB UR QL STRIP: NEGATIVE
BUN SERPL-MCNC: 12 MG/DL (ref 6–20)
BUN/CREAT SERPL: 15 (ref 7–25)
CALCIUM SPEC-SCNC: 9.7 MG/DL (ref 8.6–10.5)
CHLORIDE SERPL-SCNC: 102 MMOL/L (ref 98–107)
CLARITY UR: CLEAR
CO2 SERPL-SCNC: 28.7 MMOL/L (ref 22–29)
COLOR UR: YELLOW
CREAT SERPL-MCNC: 0.8 MG/DL (ref 0.57–1)
DEPRECATED RDW RBC AUTO: 40.9 FL (ref 37–54)
EGFRCR SERPLBLD CKD-EPI 2021: 98.7 ML/MIN/1.73
EOSINOPHIL # BLD AUTO: 0.05 10*3/MM3 (ref 0–0.4)
EOSINOPHIL NFR BLD AUTO: 0.9 % (ref 0.3–6.2)
ERYTHROCYTE [DISTWIDTH] IN BLOOD BY AUTOMATED COUNT: 12.7 % (ref 12.3–15.4)
GLOBULIN UR ELPH-MCNC: 2.9 GM/DL
GLUCOSE SERPL-MCNC: 117 MG/DL (ref 65–99)
GLUCOSE UR STRIP-MCNC: NEGATIVE MG/DL
HCT VFR BLD AUTO: 42.6 % (ref 34–46.6)
HGB BLD-MCNC: 14.3 G/DL (ref 12–15.9)
HGB UR QL STRIP.AUTO: NEGATIVE
IMM GRANULOCYTES # BLD AUTO: 0.01 10*3/MM3 (ref 0–0.05)
IMM GRANULOCYTES NFR BLD AUTO: 0.2 % (ref 0–0.5)
KETONES UR QL STRIP: NEGATIVE
LEUKOCYTE ESTERASE UR QL STRIP.AUTO: NEGATIVE
LIPASE SERPL-CCNC: 33 U/L (ref 13–60)
LYMPHOCYTES # BLD AUTO: 0.7 10*3/MM3 (ref 0.7–3.1)
LYMPHOCYTES NFR BLD AUTO: 12.6 % (ref 19.6–45.3)
MCH RBC QN AUTO: 29.2 PG (ref 26.6–33)
MCHC RBC AUTO-ENTMCNC: 33.6 G/DL (ref 31.5–35.7)
MCV RBC AUTO: 86.9 FL (ref 79–97)
MONOCYTES # BLD AUTO: 0.79 10*3/MM3 (ref 0.1–0.9)
MONOCYTES NFR BLD AUTO: 14.2 % (ref 5–12)
NEUTROPHILS NFR BLD AUTO: 4.01 10*3/MM3 (ref 1.7–7)
NEUTROPHILS NFR BLD AUTO: 71.9 % (ref 42.7–76)
NITRITE UR QL STRIP: NEGATIVE
PH UR STRIP.AUTO: 5.5 [PH] (ref 5–8)
PLATELET # BLD AUTO: 173 10*3/MM3 (ref 140–450)
PMV BLD AUTO: 9.8 FL (ref 6–12)
POTASSIUM SERPL-SCNC: 5.1 MMOL/L (ref 3.5–5.2)
PROT SERPL-MCNC: 7.3 G/DL (ref 6–8.5)
PROT UR QL STRIP: NEGATIVE
RBC # BLD AUTO: 4.9 10*6/MM3 (ref 3.77–5.28)
SODIUM SERPL-SCNC: 136 MMOL/L (ref 136–145)
SP GR UR STRIP: 1.01 (ref 1–1.03)
UROBILINOGEN UR QL STRIP: NORMAL
WBC NRBC COR # BLD AUTO: 5.57 10*3/MM3 (ref 3.4–10.8)

## 2025-02-10 PROCEDURE — 36415 COLL VENOUS BLD VENIPUNCTURE: CPT

## 2025-02-10 PROCEDURE — 25810000003 SODIUM CHLORIDE 0.9 % SOLUTION: Performed by: NURSE PRACTITIONER

## 2025-02-10 PROCEDURE — 81003 URINALYSIS AUTO W/O SCOPE: CPT | Performed by: EMERGENCY MEDICINE

## 2025-02-10 PROCEDURE — 25510000001 IOPAMIDOL PER 1 ML: Performed by: EMERGENCY MEDICINE

## 2025-02-10 PROCEDURE — 80053 COMPREHEN METABOLIC PANEL: CPT | Performed by: NURSE PRACTITIONER

## 2025-02-10 PROCEDURE — 83690 ASSAY OF LIPASE: CPT | Performed by: NURSE PRACTITIONER

## 2025-02-10 PROCEDURE — 85025 COMPLETE CBC W/AUTO DIFF WBC: CPT | Performed by: NURSE PRACTITIONER

## 2025-02-10 PROCEDURE — 96360 HYDRATION IV INFUSION INIT: CPT

## 2025-02-10 PROCEDURE — 74177 CT ABD & PELVIS W/CONTRAST: CPT

## 2025-02-10 PROCEDURE — 99285 EMERGENCY DEPT VISIT HI MDM: CPT

## 2025-02-10 RX ORDER — SODIUM CHLORIDE 0.9 % (FLUSH) 0.9 %
10 SYRINGE (ML) INJECTION AS NEEDED
Status: DISCONTINUED | OUTPATIENT
Start: 2025-02-10 | End: 2025-02-10 | Stop reason: HOSPADM

## 2025-02-10 RX ORDER — IOPAMIDOL 755 MG/ML
100 INJECTION, SOLUTION INTRAVASCULAR
Status: COMPLETED | OUTPATIENT
Start: 2025-02-10 | End: 2025-02-10

## 2025-02-10 RX ADMIN — SODIUM CHLORIDE 1000 ML: 9 INJECTION, SOLUTION INTRAVENOUS at 10:53

## 2025-02-10 RX ADMIN — IOPAMIDOL 100 ML: 755 INJECTION, SOLUTION INTRAVENOUS at 11:06

## 2025-02-10 NOTE — FSED PROVIDER NOTE
Subjective   History of Present Illness  The patient is a 34-year-old female who presents to the ER with left lower quadrant abdominal pain and back pain.  Patient with low-grade fevers.  Patient sent here from urgent care for further evaluation and treatment.    History provided by:  Patient   used: No        Review of Systems   Gastrointestinal:  Positive for abdominal pain.   Musculoskeletal:  Positive for back pain.       Past Medical History:   Diagnosis Date    Arrhythmia     SVT    Dehydration     Hypothyroidism     POTS (postural orthostatic tachycardia syndrome)        Allergies   Allergen Reactions    Metoprolol Rash       Past Surgical History:   Procedure Laterality Date    ANKLE SURGERY      BRONCHOSCOPY N/A 2023    Procedure: BRONCHOSCOPY with lung wash;  Surgeon: Kings Houston MD;  Location: [facility] ENDOSCOPY;  Service: Pulmonary;  Laterality: N/A;    CARDIAC ASSIST DEVICE INSERTION      Loop recorder     SECTION      CHOLECYSTECTOMY      FERTILITY SURGERY      IVF    TONSILLECTOMY      TUBAL ABDOMINAL LIGATION         Family History   Problem Relation Age of Onset    Hypertension Mother     Pulmonary embolism Father        Social History     Socioeconomic History    Marital status:    Tobacco Use    Smoking status: Never     Passive exposure: Never    Smokeless tobacco: Never   Vaping Use    Vaping status: Never Used   Substance and Sexual Activity    Alcohol use: Not Currently    Drug use: Defer    Sexual activity: Defer           Objective   Physical Exam  Vitals and nursing note reviewed.   Constitutional:       Appearance: Normal appearance. She is well-developed.   HENT:      Head: Normocephalic.      Right Ear: Tympanic membrane and ear canal normal.      Left Ear: Tympanic membrane and ear canal normal.      Nose: Nose normal.      Mouth/Throat:      Lips: Pink.      Mouth: Mucous membranes are moist.      Pharynx: Oropharynx is clear. Uvula midline.    Eyes:      Extraocular Movements: Extraocular movements intact.      Pupils: Pupils are equal, round, and reactive to light.   Cardiovascular:      Rate and Rhythm: Normal rate and regular rhythm.      Pulses: Normal pulses.      Heart sounds: Normal heart sounds.   Pulmonary:      Effort: Pulmonary effort is normal.      Breath sounds: Normal breath sounds.   Abdominal:      General: Abdomen is flat. Bowel sounds are normal.      Palpations: Abdomen is soft.      Tenderness:  in the suprapubic area and left lower quadrant          Comments: Patient with pain on palpation of the left left lower quadrant, suprapubic area.   Musculoskeletal:         General: Normal range of motion.      Cervical back: Full passive range of motion without pain, normal range of motion and neck supple.   Skin:     General: Skin is warm.   Neurological:      General: No focal deficit present.      Mental Status: She is alert and oriented to person, place, and time.   Psychiatric:         Mood and Affect: Mood normal.         Behavior: Behavior normal. Behavior is cooperative.         Procedures           ED Course  ED Course as of 02/10/25 1308   Mon Feb 10, 2025   1128 CT ABDOMEN PELVIS W CONTRAST     Date of Exam: 2/10/2025 10:58 AM EST     Indication: Left lower quadrant pain.     Comparison: None available.     Technique: Axial CT images were obtained of the abdomen and pelvis following the uneventful intravenous administration of iodinated contrast. Sagittal and coronal reconstructions were performed.  Automated exposure control and iterative reconstruction   methods were used.           Findings:  The spleen is enlarged, 16.4 cm AP, without focal abnormality. The liver size is upper limits normal, 18.8 cm, without focal abnormality.     The gallbladder is surgically absent. No biliary dilation is seen. Pancreas, adrenals, kidneys are within normal limits.     Large and small bowel do not appear abnormally thickened, dilated or  inflamed. The appendix is normal. Stomach appears within normal limits.     Peripherally enhancing corpus luteum cyst in the right adnexa measures 2.3 cm. Trace pelvic free fluid is present. Uterus, urinary bladder, and rectum are normal.     No acute or suspicious osseous abnormalities are identified.     The heart size is normal. The lung bases are clear.     IMPRESSION:  Impression:     1. 2.2 cm right adnexal corpus luteum cyst.  2. Trace pelvic free fluid may be physiologic or could be related to ovarian cyst rupture.  3. Splenomegaly.         [DS]   1142 WBC: 5.57 [DS]   1142 Hemoglobin: 14.3 [DS]   1142 Hematocrit: 42.6 [DS]   1156 Glucose(!): 117 [DS]   1156 BUN: 12 [DS]   1156 Creatinine: 0.80 [DS]   1156 Sodium: 136 [DS]   1156 ALT (SGPT)(!): 49 [DS]   1156 AST (SGOT)(!): 42 [DS]      ED Course User Index  [DS] Adriane Sarah APRN                                           Medical Decision Making  The patient is a 34-year-old female who presents to the ER with left lower quadrant abdominal pain and back pain.  Patient with low-grade fevers.  Patient sent here from urgent care for further evaluation and treatment.    Patient presents with left lower quadrant pain, suprapubic pain.  No evidence of acute abdomen at this time. Well appearing.  Considered ovarian torsion but doubt given history and presentation. Given work up low suspicion for acute hepatobiliary disease (including acute cholecystitis), acute pancreatitis (neg lipase), PUD and gastric perforation, acute infectious processes (pneumonia, hepatitis, pyelonephritis), acute appendicitis, vascular catastrophe, bowel obstruction or viscus perforation, diverticulitis. Presentation not consistent with other acute, emergent causes of abdominal pain at this time.  Advised patient to follow-up with primary care/OB/GYN for further evaluation and treatment.    Problems Addressed:  Abdominal pain, unspecified abdominal location: complicated acute  illness or injury    Amount and/or Complexity of Data Reviewed  Labs: ordered. Decision-making details documented in ED Course.  Radiology: ordered. Decision-making details documented in ED Course.    Risk  OTC drugs.        Final diagnoses:   Abdominal pain, unspecified abdominal location       ED Disposition  ED Disposition       ED Disposition   Discharge    Condition   Stable    Comment   Patient given discharge instructions and verbalized understanding. Patient discharged home.               Justine Watts MD  [address] [facility] [address] [phone]    Schedule an appointment as soon as possible for a visit in 1 week  As needed, If symptoms worsen    [facility] [address] [address] [phone]  Schedule an appointment as soon as possible for a visit in 1 day           Medication List      No changes were made to your prescriptions during this visit.

## 2025-02-10 NOTE — Clinical Note
[facility] [place] [address] [place] [address] [place] [address]  Phone: [phone]    Deepa Garcia was seen and treated in our emergency department on 2/10/2025.  She may return to work on 02/11/2025.         Thank you for choosing [facility].    Adriane Sarah APRN

## 2025-02-10 NOTE — DISCHARGE INSTRUCTIONS
Call for a follow up appointment with your primary care for further evaluation and treatment.     You can also follow-up with your OB/GYN or the OB/GYN listed on this paperwork.  If you continue to have pain    Tylenol/Motrin as needed for pain/fevers    Make sure patient is drinking plenty of fluids.    Return for any new or worsening symptoms.      Voice recognition transcription technology was used for documentation on this chart.  Result there may be some typos and/or introduced into the chart that were overlooked during editing reviewing.

## 2025-02-10 NOTE — Clinical Note
[facility] [place] [address] [place] [address] [place] [address]  Phone: [phone]    Deepa Garcia was seen and treated in our emergency department on 2/10/2025.  She may return to work on 02/11/2025.         Thank you for choosing [facility].    Adriane Sarah APRN

## 2025-02-11 ENCOUNTER — HOSPITAL ENCOUNTER (EMERGENCY)
Facility: HOSPITAL | Age: 35
Discharge: HOME | End: 2025-02-12
Attending: EMERGENCY MEDICINE | Admitting: EMERGENCY MEDICINE
Payer: COMMERCIAL

## 2025-02-11 ENCOUNTER — APPOINTMENT (OUTPATIENT)
Dept: GENERAL RADIOLOGY | Facility: HOSPITAL | Age: 35
End: 2025-02-11
Payer: COMMERCIAL

## 2025-02-11 DIAGNOSIS — M79.10 MYALGIA: ICD-10-CM

## 2025-02-11 DIAGNOSIS — R51.9 NONINTRACTABLE HEADACHE, UNSPECIFIED CHRONICITY PATTERN, UNSPECIFIED HEADACHE TYPE: ICD-10-CM

## 2025-02-11 DIAGNOSIS — B34.9 VIRAL INFECTION: Primary | ICD-10-CM

## 2025-02-11 LAB
ALBUMIN SERPL-MCNC: 4.1 G/DL (ref 3.5–5.2)
ALBUMIN/GLOB SERPL: 1.5 G/DL
ALP SERPL-CCNC: 75 U/L (ref 39–117)
ALT SERPL W P-5'-P-CCNC: 100 U/L (ref 1–33)
ANION GAP SERPL CALCULATED.3IONS-SCNC: 11 MMOL/L (ref 5–15)
AST SERPL-CCNC: 80 U/L (ref 1–32)
B PARAPERT DNA SPEC QL NAA+PROBE: NOT DETECTED
B PERT DNA SPEC QL NAA+PROBE: NOT DETECTED
BASOPHILS # BLD AUTO: 0.01 10*3/MM3 (ref 0–0.2)
BASOPHILS NFR BLD AUTO: 0.3 % (ref 0–1.5)
BILIRUB SERPL-MCNC: 0.7 MG/DL (ref 0–1.2)
BUN SERPL-MCNC: 12 MG/DL (ref 6–20)
BUN/CREAT SERPL: 15.6 (ref 7–25)
C PNEUM DNA NPH QL NAA+NON-PROBE: NOT DETECTED
CALCIUM SPEC-SCNC: 9.2 MG/DL (ref 8.6–10.5)
CHLORIDE SERPL-SCNC: 101 MMOL/L (ref 98–107)
CO2 SERPL-SCNC: 22 MMOL/L (ref 22–29)
CREAT SERPL-MCNC: 0.77 MG/DL (ref 0.57–1)
D-LACTATE SERPL-SCNC: 0.7 MMOL/L (ref 0.3–2)
DEPRECATED RDW RBC AUTO: 38.2 FL (ref 37–54)
EGFRCR SERPLBLD CKD-EPI 2021: 103.3 ML/MIN/1.73
EOSINOPHIL # BLD AUTO: 0.01 10*3/MM3 (ref 0–0.4)
EOSINOPHIL NFR BLD AUTO: 0.3 % (ref 0.3–6.2)
ERYTHROCYTE [DISTWIDTH] IN BLOOD BY AUTOMATED COUNT: 12.2 % (ref 12.3–15.4)
FLUAV SUBTYP SPEC NAA+PROBE: NOT DETECTED
FLUBV RNA ISLT QL NAA+PROBE: NOT DETECTED
GLOBULIN UR ELPH-MCNC: 2.7 GM/DL
GLUCOSE SERPL-MCNC: 105 MG/DL (ref 65–99)
HADV DNA SPEC NAA+PROBE: NOT DETECTED
HCG SERPL QL: NEGATIVE
HCOV 229E RNA SPEC QL NAA+PROBE: NOT DETECTED
HCOV HKU1 RNA SPEC QL NAA+PROBE: NOT DETECTED
HCOV NL63 RNA SPEC QL NAA+PROBE: NOT DETECTED
HCOV OC43 RNA SPEC QL NAA+PROBE: NOT DETECTED
HCT VFR BLD AUTO: 40.8 % (ref 34–46.6)
HGB BLD-MCNC: 13.8 G/DL (ref 12–15.9)
HMPV RNA NPH QL NAA+NON-PROBE: NOT DETECTED
HPIV1 RNA ISLT QL NAA+PROBE: NOT DETECTED
HPIV2 RNA SPEC QL NAA+PROBE: NOT DETECTED
HPIV3 RNA NPH QL NAA+PROBE: NOT DETECTED
HPIV4 P GENE NPH QL NAA+PROBE: NOT DETECTED
IMM GRANULOCYTES # BLD AUTO: 0.01 10*3/MM3 (ref 0–0.05)
IMM GRANULOCYTES NFR BLD AUTO: 0.3 % (ref 0–0.5)
LYMPHOCYTES # BLD AUTO: 0.49 10*3/MM3 (ref 0.7–3.1)
LYMPHOCYTES NFR BLD AUTO: 14 % (ref 19.6–45.3)
M PNEUMO IGG SER IA-ACNC: NOT DETECTED
MCH RBC QN AUTO: 28.9 PG (ref 26.6–33)
MCHC RBC AUTO-ENTMCNC: 33.8 G/DL (ref 31.5–35.7)
MCV RBC AUTO: 85.5 FL (ref 79–97)
MONOCYTES # BLD AUTO: 0.25 10*3/MM3 (ref 0.1–0.9)
MONOCYTES NFR BLD AUTO: 7.1 % (ref 5–12)
NEUTROPHILS NFR BLD AUTO: 2.74 10*3/MM3 (ref 1.7–7)
NEUTROPHILS NFR BLD AUTO: 78 % (ref 42.7–76)
NRBC BLD AUTO-RTO: 0 /100 WBC (ref 0–0.2)
PLATELET # BLD AUTO: 124 10*3/MM3 (ref 140–450)
PMV BLD AUTO: 9.3 FL (ref 6–12)
POTASSIUM SERPL-SCNC: 4.1 MMOL/L (ref 3.5–5.2)
PROT SERPL-MCNC: 6.8 G/DL (ref 6–8.5)
RBC # BLD AUTO: 4.77 10*6/MM3 (ref 3.77–5.28)
RHINOVIRUS RNA SPEC NAA+PROBE: NOT DETECTED
RSV RNA NPH QL NAA+NON-PROBE: NOT DETECTED
SARS-COV-2 RNA RESP QL NAA+PROBE: NOT DETECTED
SODIUM SERPL-SCNC: 134 MMOL/L (ref 136–145)
TROPONIN T SERPL HS-MCNC: <6 NG/L
WBC NRBC COR # BLD AUTO: 3.51 10*3/MM3 (ref 3.4–10.8)
WHOLE BLOOD HOLD COAG: NORMAL

## 2025-02-11 PROCEDURE — 84484 ASSAY OF TROPONIN QUANT: CPT | Performed by: EMERGENCY MEDICINE

## 2025-02-11 PROCEDURE — 0202U NFCT DS 22 TRGT SARS-COV-2: CPT | Performed by: EMERGENCY MEDICINE

## 2025-02-11 PROCEDURE — 36415 COLL VENOUS BLD VENIPUNCTURE: CPT

## 2025-02-11 PROCEDURE — 87040 BLOOD CULTURE FOR BACTERIA: CPT | Performed by: EMERGENCY MEDICINE

## 2025-02-11 PROCEDURE — 71045 X-RAY EXAM CHEST 1 VIEW: CPT

## 2025-02-11 PROCEDURE — 99284 EMERGENCY DEPT VISIT MOD MDM: CPT

## 2025-02-11 PROCEDURE — 80053 COMPREHEN METABOLIC PANEL: CPT | Performed by: EMERGENCY MEDICINE

## 2025-02-11 PROCEDURE — 93005 ELECTROCARDIOGRAM TRACING: CPT | Performed by: EMERGENCY MEDICINE

## 2025-02-11 PROCEDURE — 84703 CHORIONIC GONADOTROPIN ASSAY: CPT | Performed by: EMERGENCY MEDICINE

## 2025-02-11 PROCEDURE — 85025 COMPLETE CBC W/AUTO DIFF WBC: CPT | Performed by: EMERGENCY MEDICINE

## 2025-02-11 PROCEDURE — 83605 ASSAY OF LACTIC ACID: CPT | Performed by: EMERGENCY MEDICINE

## 2025-02-11 RX ORDER — SODIUM CHLORIDE 0.9 % (FLUSH) 0.9 %
10 SYRINGE (ML) INJECTION AS NEEDED
Status: DISCONTINUED | OUTPATIENT
Start: 2025-02-11 | End: 2025-02-12 | Stop reason: HOSPADM

## 2025-02-11 RX ORDER — ACETAMINOPHEN 500 MG
1000 TABLET ORAL ONCE
Status: COMPLETED | OUTPATIENT
Start: 2025-02-11 | End: 2025-02-11

## 2025-02-11 RX ADMIN — ACETAMINOPHEN 1000 MG: 500 TABLET, FILM COATED ORAL at 22:46

## 2025-02-12 VITALS
DIASTOLIC BLOOD PRESSURE: 70 MMHG | RESPIRATION RATE: 18 BRPM | OXYGEN SATURATION: 100 % | BODY MASS INDEX: 30.1 KG/M2 | SYSTOLIC BLOOD PRESSURE: 112 MMHG | HEART RATE: 107 BPM | HEIGHT: 71 IN | WEIGHT: 215 LBS | TEMPERATURE: 100.3 F

## 2025-02-12 LAB
APPEARANCE CSF: CLEAR
C GATTII+NEOFOR DNA CSF QL NAA+NON-PROBE: NOT DETECTED
CMV DNA CSF QL NAA+PROBE: NOT DETECTED
COLOR CSF: COLORLESS
E COLI K1 DNA CSF QL NAA+NON-PROBE: NOT DETECTED
EV RNA CSF QL NAA+PROBE: NOT DETECTED
GEN 5 1HR TROPONIN T REFLEX: <6 NG/L
GLUCOSE CSF-MCNC: 58 MG/DL (ref 40–70)
GP B STREP DNA SPEC QL NAA+PROBE: NOT DETECTED
HAEM INFLU SEROTYP DNA SPEC NAA+PROBE: NOT DETECTED
HHV6 DNA CSF QL NAA+PROBE: NOT DETECTED
HOLD SPECIMEN: NORMAL
HSV1 DNA CSF QL NAA+PROBE: NOT DETECTED
HSV2 DNA CSF QL NAA+PROBE: NOT DETECTED
L MONOCYTOG RRNA SPEC QL PROBE: NOT DETECTED
N MEN DNA SPEC QL NAA+PROBE: NOT DETECTED
NUC CELL # CSF MANUAL: <1 /MM3 (ref 0–5)
PARECHOVIRUS A RNA CSF QL NAA+NON-PROBE: NOT DETECTED
PROT CSF-MCNC: 34.1 MG/DL (ref 15–45)
QT INTERVAL: 318 MS
QTC INTERVAL: 420 MS
RBC # CSF MANUAL: 23 /MM3 (ref 0–5)
S PNEUM DNA CSF QL NAA+NON-PROBE: NOT DETECTED
SPECIMEN VOL CSF: 1 ML
TROPONIN T NUMERIC DELTA: NORMAL
TUBE # CSF: 4
VZV DNA CSF QL NAA+PROBE: NOT DETECTED

## 2025-02-12 PROCEDURE — 89050 BODY FLUID CELL COUNT: CPT | Performed by: EMERGENCY MEDICINE

## 2025-02-12 PROCEDURE — 25010000002 CEFTRIAXONE PER 250 MG: Performed by: EMERGENCY MEDICINE

## 2025-02-12 PROCEDURE — 25010000002 MIDAZOLAM PER 1 MG: Performed by: EMERGENCY MEDICINE

## 2025-02-12 PROCEDURE — 96367 TX/PROPH/DG ADDL SEQ IV INF: CPT

## 2025-02-12 PROCEDURE — 84157 ASSAY OF PROTEIN OTHER: CPT | Performed by: EMERGENCY MEDICINE

## 2025-02-12 PROCEDURE — 96365 THER/PROPH/DIAG IV INF INIT: CPT

## 2025-02-12 PROCEDURE — 87015 SPECIMEN INFECT AGNT CONCNTJ: CPT | Performed by: EMERGENCY MEDICINE

## 2025-02-12 PROCEDURE — 96366 THER/PROPH/DIAG IV INF ADDON: CPT

## 2025-02-12 PROCEDURE — 87483 CNS DNA AMP PROBE TYPE 12-25: CPT | Performed by: EMERGENCY MEDICINE

## 2025-02-12 PROCEDURE — 87205 SMEAR GRAM STAIN: CPT | Performed by: EMERGENCY MEDICINE

## 2025-02-12 PROCEDURE — 25010000002 DEXAMETHASONE PER 1 MG: Performed by: EMERGENCY MEDICINE

## 2025-02-12 PROCEDURE — 87070 CULTURE OTHR SPECIMN AEROBIC: CPT | Performed by: EMERGENCY MEDICINE

## 2025-02-12 PROCEDURE — 25810000003 SODIUM CHLORIDE 0.9 % SOLUTION: Performed by: EMERGENCY MEDICINE

## 2025-02-12 PROCEDURE — 82945 GLUCOSE OTHER FLUID: CPT | Performed by: EMERGENCY MEDICINE

## 2025-02-12 PROCEDURE — 96375 TX/PRO/DX INJ NEW DRUG ADDON: CPT

## 2025-02-12 PROCEDURE — 25010000002 VANCOMYCIN 2-0.9 GM/500ML-% SOLUTION: Performed by: EMERGENCY MEDICINE

## 2025-02-12 PROCEDURE — 25010000002 METOCLOPRAMIDE PER 10 MG: Performed by: EMERGENCY MEDICINE

## 2025-02-12 RX ORDER — DEXAMETHASONE SODIUM PHOSPHATE 4 MG/ML
10 INJECTION, SOLUTION INTRA-ARTICULAR; INTRALESIONAL; INTRAMUSCULAR; INTRAVENOUS; SOFT TISSUE ONCE
Status: COMPLETED | OUTPATIENT
Start: 2025-02-12 | End: 2025-02-12

## 2025-02-12 RX ORDER — VANCOMYCIN 2 GRAM/500 ML IN 0.9 % SODIUM CHLORIDE INTRAVENOUS
20 ONCE
Status: COMPLETED | OUTPATIENT
Start: 2025-02-12 | End: 2025-02-12

## 2025-02-12 RX ORDER — MIDAZOLAM HYDROCHLORIDE 1 MG/ML
2 INJECTION, SOLUTION INTRAMUSCULAR; INTRAVENOUS ONCE
Status: COMPLETED | OUTPATIENT
Start: 2025-02-12 | End: 2025-02-12

## 2025-02-12 RX ORDER — METOCLOPRAMIDE HYDROCHLORIDE 5 MG/ML
10 INJECTION INTRAMUSCULAR; INTRAVENOUS ONCE
Status: COMPLETED | OUTPATIENT
Start: 2025-02-12 | End: 2025-02-12

## 2025-02-12 RX ADMIN — DEXAMETHASONE SODIUM PHOSPHATE 10 MG: 4 INJECTION, SOLUTION INTRAMUSCULAR; INTRAVENOUS at 01:46

## 2025-02-12 RX ADMIN — METOCLOPRAMIDE 10 MG: 5 INJECTION, SOLUTION INTRAMUSCULAR; INTRAVENOUS at 01:46

## 2025-02-12 RX ADMIN — CEFTRIAXONE 2000 MG: 2 INJECTION, POWDER, FOR SOLUTION INTRAMUSCULAR; INTRAVENOUS at 01:45

## 2025-02-12 RX ADMIN — SODIUM CHLORIDE 1000 ML: 9 INJECTION, SOLUTION INTRAVENOUS at 02:41

## 2025-02-12 RX ADMIN — Medication 2000 MG: at 02:41

## 2025-02-12 RX ADMIN — MIDAZOLAM 2 MG: 1 INJECTION INTRAMUSCULAR; INTRAVENOUS at 00:35

## 2025-02-12 NOTE — ED NOTES
Patient reports that she was at [facility] yesterday for abdominal pain. Patient reports fever x 4 days. Patient states she noticed rash at 1900 across chest and upper legs. Patient also reports neck stiffness and tenderness and headache. No weakness noted in extremities. Patient stated that she took 600mg Ibuprofen at 1700. Patient noted to be tachycardic, states that she takes propranolol 2 times a day for rate control. Patient reports that she has not taken her night dose.

## 2025-02-12 NOTE — ED PROVIDER NOTES
Subjective   History of Present Illness  34-year-old female with history of POTS presents for multiple complaints.  Started noting some abdominal pain, back pain and fevers yesterday.  Seen in outside emergency department with unremarkable CT and labs and was discharged home.  Was found to have ovarian cyst.  Patient states abdominal pain is already resolved but now having back pain headache, body aches, some neck stiffness, new rash.  Rash is nonpainful.  Does not itch.  Been taking ibuprofen for the fever and states is not controlling it as well today as it was.  Review of Systems  See HPI.  Past Medical History:   Diagnosis Date    Arrhythmia     SVT    Dehydration     Hypothyroidism     POTS (postural orthostatic tachycardia syndrome)        Allergies   Allergen Reactions    Metoprolol Rash       Past Surgical History:   Procedure Laterality Date    ANKLE SURGERY      BRONCHOSCOPY N/A 2023    Procedure: BRONCHOSCOPY with lung wash;  Surgeon: Kings Houston MD;  Location: [facility] ENDOSCOPY;  Service: Pulmonary;  Laterality: N/A;    CARDIAC ASSIST DEVICE INSERTION      Loop recorder     SECTION      CHOLECYSTECTOMY      FERTILITY SURGERY      IVF    TONSILLECTOMY      TUBAL ABDOMINAL LIGATION         Family History   Problem Relation Age of Onset    Hypertension Mother     Pulmonary embolism Father        Social History     Socioeconomic History    Marital status:    Tobacco Use    Smoking status: Never     Passive exposure: Never    Smokeless tobacco: Never   Vaping Use    Vaping status: Never Used   Substance and Sexual Activity    Alcohol use: Not Currently    Drug use: Defer    Sexual activity: Defer           Objective   Physical Exam  Constitutional:  No acute distress.  Head:  Atraumatic.  Normocephalic.   Eyes:  No scleral icterus. Normal conjunctivae  ENT:  Moist mucosa.  No nasal discharge present.  No meningeal signs.  Cardiovascular:  Well perfused.  Equal pulses.  Regular rhythm and  "tachycardic rate.  Normal capillary refill.    Pulmonary/Chest:  No respiratory distress.  Airway patent.  No tachypnea.  No accessory muscle usage.    Abdominal:  Nondistended. Nontender.   Extremities:  No peripheral edema.  No Deformity  Skin:  Warm, dry, diffuse nontender blanchable maculopapular rash.  Neurological:  Alert, awake, and appropriate.  Normal speech.      Lumbar Puncture    Date/Time: 2/12/2025 12:53 AM    Performed by: Mahendra Mathew MD  Authorized by: Mahendra Mathew MD    Consent:     Consent obtained:  Verbal    Consent given by:  Patient    Risks, benefits, and alternatives were discussed: yes      Risks discussed:  Bleeding, infection, pain, repeat procedure, nerve damage and headache    Alternatives discussed:  No treatment  Universal protocol:     Procedure explained and questions answered to patient or proxy's satisfaction: yes      Patient identity confirmed:  Verbally with patient  Pre-procedure details:     Procedure purpose:  Diagnostic    Preparation: Patient was prepped and draped in usual sterile fashion    Sedation:     Sedation type:  Anxiolysis (Versed)  Procedure details:     Lumbar space:  L4-L5 interspace    Patient position:  L lateral decubitus    Needle gauge:  22    Needle type:  Spinal needle - Quincke tip    Number of attempts:  2    Fluid appearance:  Blood-tinged then clearing    Tubes of fluid:  4    Total volume (ml):  5  Post-procedure details:     Puncture site:  Adhesive bandage applied    Procedure completion:  Tolerated well, no immediate complications             ED Course      /75   Pulse 114   Temp 100.3 °F (37.9 °C) (Oral)   Resp 18   Ht 180.3 cm (71\")   Wt 97.5 kg (215 lb)   LMP  (Approximate)   SpO2 96%   BMI 29.99 kg/m²   Labs Reviewed   COMPREHENSIVE METABOLIC PANEL - Abnormal; Notable for the following components:       Result Value    Glucose 105 (*)     Sodium 134 (*)     ALT (SGPT) 100 (*)     AST (SGOT) 80 (*)     All other " components within normal limits    Narrative:     GFR Categories in Chronic Kidney Disease (CKD)      GFR Category          GFR (mL/min/1.73)    Interpretation  G1                     90 or greater         Normal or high (1)  G2                      60-89                Mild decrease (1)  G3a                   45-59                Mild to moderate decrease  G3b                   30-44                Moderate to severe decrease  G4                    15-29                Severe decrease  G5                    14 or less           Kidney failure          (1)In the absence of evidence of kidney disease, neither GFR category G1 or G2 fulfill the criteria for CKD.    eGFR calculation 2021 CKD-EPI creatinine equation, which does not include race as a factor   CBC WITH AUTO DIFFERENTIAL - Abnormal; Notable for the following components:    RDW 12.2 (*)     Platelets 124 (*)     Neutrophil % 78.0 (*)     Lymphocyte % 14.0 (*)     Lymphocytes, Absolute 0.49 (*)     All other components within normal limits   CELL COUNT CSF - Abnormal; Notable for the following components:    RBC, CSF 23 (*)     All other components within normal limits    Narrative:     Differential not indicated.   RESPIRATORY PANEL PCR W/ COVID-19 (SARS-COV-2), NP SWAB IN UTM/VTP, 2 HR TAT - Normal    Narrative:     In the setting of a positive respiratory panel with a viral infection PLUS a negative procalcitonin without other underlying concern for bacterial infection, consider observing off antibiotics or discontinuation of antibiotics and continue supportive care. If the respiratory panel is positive for atypical bacterial infection (Bordetella pertussis, Chlamydophila pneumoniae, or Mycoplasma pneumoniae), consider antibiotic de-escalation to target atypical bacterial infection.   MENINGITIS / ENCEPHALITIS PANEL, PCR - Normal   HCG, SERUM, QUALITATIVE - Normal   TROPONIN - Normal    Narrative:     High Sensitive Troponin T Reference Range:  <14.0 ng/L-  Negative Female for AMI  <22.0 ng/L- Negative Male for AMI  >=14 - Abnormal Female indicating possible myocardial injury.  >=22 - Abnormal Male indicating possible myocardial injury.   Clinicians would have to utilize clinical acumen, EKG, Troponin, and serial changes to determine if it is an Acute Myocardial Infarction or myocardial injury due to an underlying chronic condition.        PROTEIN, CSF - Normal   GLUCOSE, CSF - Normal   POC LACTATE - Normal   BLOOD CULTURE   BLOOD CULTURE   CULTURE, CSF   HIGH SENSITIVITIY TROPONIN T 1HR    Narrative:     High Sensitive Troponin T Reference Range:  <14.0 ng/L- Negative Female for AMI  <22.0 ng/L- Negative Male for AMI  >=14 - Abnormal Female indicating possible myocardial injury.  >=22 - Abnormal Male indicating possible myocardial injury.   Clinicians would have to utilize clinical acumen, EKG, Troponin, and serial changes to determine if it is an Acute Myocardial Infarction or myocardial injury due to an underlying chronic condition.        CBC AND DIFFERENTIAL    Narrative:     The following orders were created for panel order CBC & Differential.  Procedure                               Abnormality         Status                     ---------                               -----------         ------                     CBC Auto Differential[[id number]]        Abnormal            Final result                 Please view results for these tests on the individual orders.   EXTRA TUBES    Narrative:     The following orders were created for panel order Extra Tubes.  Procedure                               Abnormality         Status                     ---------                               -----------         ------                     Light Blue Top[[id number]]                                   Final result                 Please view results for these tests on the individual orders.   LIGHT BLUE TOP   CELL COUNT WITH DIFFERENTIAL, CSF    Narrative:     The following  orders were created for panel order Cell Count With Differential, CSF Use CSF Tube: 4.  Procedure                               Abnormality         Status                     ---------                               -----------         ------                     Cell Count, CSF - Cerebr...[[id number]]  Abnormal            Final result                 Please view results for these tests on the individual orders.   CSF TUBE     Medications   sodium chloride 0.9 % flush 10 mL (has no administration in time range)   acetaminophen (TYLENOL) tablet 1,000 mg (1,000 mg Oral Given 2/11/25 2246)   midazolam (VERSED) injection 2 mg (2 mg Intravenous Given 2/12/25 0035)   cefTRIAXone (ROCEPHIN) 2,000 mg in sodium chloride 0.9 % 100 mL MBP (0 mg Intravenous Stopped 2/12/25 0241)   vancomycin IVPB 2000 mg in 0.9% Sodium Chloride 500 mL (2,000 mg Intravenous New Bag 2/12/25 0241)   dexAMETHasone (DECADRON) injection 10 mg (10 mg Intravenous Given 2/12/25 0146)   metoclopramide (REGLAN) injection 10 mg (10 mg Intravenous Given 2/12/25 0146)   sodium chloride 0.9 % bolus 1,000 mL (0 mL Intravenous Stopped 2/12/25 0402)     XR Chest 1 View   Final Result   Impression:   No radiographic evidence of acute cardiopulmonary abnormality.            Electronically Signed: Fredrick Vargas     2/11/2025 10:54 PM EST     Workstation ID: [id number]                                                         Medical Decision Making  Problems Addressed:  Myalgia: complicated acute illness or injury  Nonintractable headache, unspecified chronicity pattern, unspecified headache type: complicated acute illness or injury  Viral infection: complicated acute illness or injury    Amount and/or Complexity of Data Reviewed  Labs: ordered.  Radiology: ordered.  ECG/medicine tests: ordered.    Risk  OTC drugs.  Prescription drug management.    EKG interpretation: 10:41 PM, rate 105, sinus tachycardia, normal axis, normal intervals, no acute ischemic changes.    My  interpretation chest x-ray is no focal infiltrate pneumothorax.  See system for radiology interpretation    Patient with some thrombocytopenia, complaining of neck stiffness, headache, fevers, new rash.  Reassuring exam overall with low suspicion for meningitis however unable to rule out without LP.  LP performed.  See procedure note.  Pending results.  Antibiotics ordered.    Final diagnoses:   Viral infection   Nonintractable headache, unspecified chronicity pattern, unspecified headache type   Myalgia       ED Disposition  ED Disposition       ED Disposition   Discharge    Condition   Stable    Comment   --               No follow-up provider specified.       Medication List      No changes were made to your prescriptions during this visit.            Norman Goldman MD  02/12/25 9819

## 2025-02-15 LAB
BACTERIA SPEC AEROBE CULT: NORMAL
GRAM STN SPEC: NORMAL
GRAM STN SPEC: NORMAL

## 2025-02-16 LAB
BACTERIA SPEC AEROBE CULT: NORMAL
BACTERIA SPEC AEROBE CULT: NORMAL

## (undated) DEVICE — BAPTIST FLOYD BRONCHOSCOPY: Brand: MEDLINE INDUSTRIES, INC.